# Patient Record
Sex: FEMALE | Race: WHITE | NOT HISPANIC OR LATINO | Employment: FULL TIME | URBAN - METROPOLITAN AREA
[De-identification: names, ages, dates, MRNs, and addresses within clinical notes are randomized per-mention and may not be internally consistent; named-entity substitution may affect disease eponyms.]

---

## 2024-02-28 ENCOUNTER — TELEPHONE (OUTPATIENT)
Dept: NEUROLOGY | Facility: CLINIC | Age: 44
End: 2024-02-28

## 2024-03-12 ENCOUNTER — OFFICE VISIT (OUTPATIENT)
Dept: ENDOCRINOLOGY | Facility: CLINIC | Age: 44
End: 2024-03-12
Payer: COMMERCIAL

## 2024-03-12 VITALS
SYSTOLIC BLOOD PRESSURE: 130 MMHG | HEIGHT: 63 IN | HEART RATE: 96 BPM | BODY MASS INDEX: 31.64 KG/M2 | DIASTOLIC BLOOD PRESSURE: 76 MMHG | WEIGHT: 178.6 LBS

## 2024-03-12 DIAGNOSIS — E04.1 THYROID NODULE: ICD-10-CM

## 2024-03-12 DIAGNOSIS — R79.89 ABNORMAL THYROID BLOOD TEST: Primary | ICD-10-CM

## 2024-03-12 PROCEDURE — 99244 OFF/OP CNSLTJ NEW/EST MOD 40: CPT | Performed by: STUDENT IN AN ORGANIZED HEALTH CARE EDUCATION/TRAINING PROGRAM

## 2024-03-12 RX ORDER — ONDANSETRON 4 MG/1
4 TABLET, FILM COATED ORAL EVERY 8 HOURS PRN
COMMUNITY

## 2024-03-12 RX ORDER — UBIDECARENONE 50 MG
50 CAPSULE ORAL DAILY
COMMUNITY

## 2024-03-12 NOTE — PROGRESS NOTES
Eve Crowley 43 y.o. female MRN: [id number]    Encounter: [id number]      Assessment/Plan     Assessment:  This is a 43 y.o.-year-old female who is seen in consultation for abnormal thyroid function studies    Plan:  Abnormal thyroid function test  TSH 0.41 (low), free t4 1.14 (high), thyroglobulin Ab 7 (high) on 3/4/2024. Prior to this her TSH was normal in Dec 2023 and sept 2022  - We suspect her abnormal thyroid function test is likely due to subacute thyroiditis related to her preceding viral illness and will obtain TSH, free t4, T3, TSI in about 4 weeks to evaluate for it.    Right thyroid nodule  - She is incidentally noted to have sub centimeter right thyroid nodule on MRI neck, we will obtain US thyroid to further characterize this.    CC:   Abnormal thyroid function test    History of Present Illness     HPI:  This is a 43 y.o.-year-old female who is seen in consultation for abnormal thyroid function studies    She went to the ED with face tingling and was found to have tachycardia in 150s, was suspected to have infection, was given antibiotics. She reports to have intermittent palpitations resting as well as on exertion. She has had tremors and anxiety since last 2 weeks. She is not on any OTC or herbal supplementations currently. Previously she was on multivitamin prior to recent ED visit. Denies of any amiodarone, lithium, iodinated contrast. Denies of any compressive sx. She had recent viral illnesses in mid Feb. She reports that she had rats in her house recently. She does not desire pregnancy. Denies of any skin, hair, nail changes. She reports to have cold/heat intolerance, lower energy, sleep disturbances in last 2 weeks. She had some diarrhea but attributes it to doxycycline use. She has lost 10 lbs in last 2 weeks. Denies of tobacco use and pain on eye movement or dry eyes.She has some blurry vision    Denies of any radiation to head, neck, chest area, neck surgeries. Denies of any concussions  in the past. Denies of any thyroid cancers. Mother and sister have hashimoto's thyroiditis. She has maternal aunt who has hashimoto's thyroiditis.    Review of Systems   Constitutional:  Positive for appetite change, fatigue and unexpected weight change. Negative for activity change.   HENT:  Negative for congestion, sore throat, trouble swallowing and voice change.    Eyes:  Negative for redness and visual disturbance.   Respiratory:  Negative for cough and shortness of breath.    Cardiovascular:  Positive for palpitations. Negative for leg swelling.   Gastrointestinal:  Positive for nausea. Negative for abdominal pain, constipation, diarrhea and vomiting.   Endocrine: Positive for cold intolerance and heat intolerance. Negative for polydipsia, polyphagia and polyuria.   Genitourinary:  Negative for difficulty urinating and dysuria.   Musculoskeletal:  Positive for neck pain. Negative for gait problem.   Skin:  Negative for color change.   Neurological:  Positive for dizziness. Negative for syncope.   Psychiatric/Behavioral:  Negative for agitation and behavioral problems. The patient is nervous/anxious.    All other systems reviewed and are negative.      Historical Information   No past medical history on file.  No past surgical history on file.  Social History   Social History     Substance and Sexual Activity   Alcohol Use Yes    Comment: not in a while     Social History     Substance and Sexual Activity   Drug Use Yes    Types: Marijuana    Comment: CBD gummies for anxity but not often     Social History     Tobacco Use   Smoking Status Never   Smokeless Tobacco Never     Family History: No family history on file.    Meds/Allergies   Current Outpatient Medications   Medication Sig Dispense Refill    ondansetron (Zofran) 4 mg tablet Take 4 mg by mouth every 8 (eight) hours as needed for nausea or vomiting      Cholecalciferol 50 MCG (2000 UT) CAPS Take 1 capsule by mouth      Coenzyme Q10 50 MG CAPS Take 50 mg  "by mouth daily      MAGNESIUM PO Take by mouth       No current facility-administered medications for this visit.     No Known Allergies    Objective   Vitals: Blood pressure 130/76, pulse 96, height 5' 3\" (1.6 m), weight 81 kg (178 lb 9.6 oz).    Physical Exam  Constitutional:       Appearance: Normal appearance.   HENT:      Head: Normocephalic and atraumatic.      Comments: No lid lag bilaterally  Cardiovascular:      Rate and Rhythm: Normal rate and regular rhythm.      Pulses: Normal pulses.      Heart sounds: Normal heart sounds. No murmur heard.     No gallop.   Pulmonary:      Effort: Pulmonary effort is normal.      Breath sounds: Normal breath sounds. No wheezing or rales.   Abdominal:      General: Bowel sounds are normal.      Palpations: Abdomen is soft.      Tenderness: There is no abdominal tenderness.   Musculoskeletal:         General: No swelling.      Cervical back: Normal range of motion and neck supple. No tenderness.      Right lower leg: No edema.      Left lower leg: No edema.      Comments: No tremors on outstretched hands   Lymphadenopathy:      Cervical: No cervical adenopathy.   Skin:     General: Skin is warm.   Neurological:      Mental Status: She is alert and oriented to person, place, and time. Mental status is at baseline.      Gait: Gait normal.   Psychiatric:         Mood and Affect: Mood normal.         Behavior: Behavior normal.         The history was obtained from the review of the chart, patient.    Lab Results:        Imaging Studies:       I have personally reviewed pertinent reports.      Portions of the record may have been created with voice recognition software. Occasional wrong word or \"sound a like\" substitutions may have occurred due to the inherent limitations of voice recognition software. Read the chart carefully and recognize, using context, where substitutions have occurred.    "

## 2024-03-22 ENCOUNTER — TELEPHONE (OUTPATIENT)
Dept: NEUROLOGY | Facility: CLINIC | Age: 44
End: 2024-03-22

## 2024-03-22 NOTE — TELEPHONE ENCOUNTER
Left voicemail for patient to call back and confirm upcoming appointment with Dr. Wolfe 3/27 at 8 am .

## 2024-03-27 ENCOUNTER — OFFICE VISIT (OUTPATIENT)
Dept: NEUROLOGY | Facility: CLINIC | Age: 44
End: 2024-03-27
Payer: COMMERCIAL

## 2024-03-27 VITALS
HEART RATE: 110 BPM | BODY MASS INDEX: 30.83 KG/M2 | TEMPERATURE: 97.8 F | DIASTOLIC BLOOD PRESSURE: 80 MMHG | HEIGHT: 63 IN | SYSTOLIC BLOOD PRESSURE: 128 MMHG | WEIGHT: 174 LBS

## 2024-03-27 DIAGNOSIS — R20.2 PARESTHESIAS: Primary | ICD-10-CM

## 2024-03-27 DIAGNOSIS — M54.2 NECK DISCOMFORT: ICD-10-CM

## 2024-03-27 DIAGNOSIS — M62.838 MUSCLE SPASMS OF NECK: ICD-10-CM

## 2024-03-27 DIAGNOSIS — R00.0 TACHYCARDIA: ICD-10-CM

## 2024-03-27 PROCEDURE — 99205 OFFICE O/P NEW HI 60 MIN: CPT | Performed by: PSYCHIATRY & NEUROLOGY

## 2024-03-27 RX ORDER — CHLORAL HYDRATE 500 MG
CAPSULE ORAL
COMMUNITY

## 2024-03-27 RX ORDER — TIZANIDINE HYDROCHLORIDE 2 MG/1
2 CAPSULE, GELATIN COATED ORAL
Qty: 30 CAPSULE | Refills: 1 | Status: SHIPPED | OUTPATIENT
Start: 2024-03-27

## 2024-03-27 NOTE — PROGRESS NOTES
NEUROLOGY [facility]ENC[facility] CLINIC     CONSULT NOTE         Patient ID: Eve Crowley is a 43 y.o. female.    Assessment/Plan:    Paresthesias  Patient with acute onset of facial numbness in V2 distribution and right index finger numbness in February 2024. Occurred after visiting a friend and sleeping on a different mattress/pillow. Evaluated in the ED and admitted for further workup. At the time was noted to have leukocytosis of 19, abnormal thyroid studies, initially equivocal Lyme testing but negative after reflex testing. This was confirmed by ID at follow up appointment. She also had MRI brain w/wo, MRA head and MRA neck that were all unrevealing. Additionally reports intermittent tachycardia since the onset of her numbness. On exam she does have decreased sensation to pinprick  Overall the etiology of her symptoms are unclear. Suspect that she likely had a viral illness at the time given the leukocytosis. Did not have an LP at the time but may have been a low-grade viral meningitis. Also on the differential includes atypical trigeminal neuralgia, MRI-negative stroke, vs complex headache. Recommend additional testing including MRI of the cervical spine without contrast (to note, has significant bilateral muscle spasm and reduced ROM in the cervical spine, reports straightening of cervical lordosis on XR done at chiropractor office) as well as check for Sjogren's antibodies as it can cause abnormal facial sensations. Will plan on following up with patient after testing completed. Advised to follow up with her infectious disease doctor and PCP regarding her symptoms. Follow up in our office in approximately 3 months. Advised to call with questions or concerns in the interim    Muscle spasms of neck  Neck discomfort along with reduced ROM and muscle spasms on exam. Additionally noted to have trigger points along the trapezius muscles. No tenderness over greater or lesser occipital nerves. Suspect that her symptoms may  be stemming from her neck and thus recommend additional imaging. She is already doing stretches/yoga for her neck and is seeing chiropractor. Also recommended using heat. Can also try PRN tizanidine at bedtime to help with the neck discomfort. Avoid chiropractic manipulation of the neck until imaging is completed     Tachycardia  On orthostatic vitals, noted to have an increase in HR without a change in BP. While this increase does not meet the threshold for POTS, concern that there may be an underlying dysautonomia given her intermittent tachycardia as well as GI issues. Will keep on the differential and consider further workup once initial workup is completed.        Diagnoses and all orders for this visit:    Paresthesias  -     MRI cervical spine wo contrast; Future  -     Sjogren's Antibodies; Future  -     TiZANidine (ZANAFLEX) 2 MG capsule; Take 1 capsule (2 mg total) by mouth daily at bedtime as needed for muscle spasms    Tachycardia    Neck discomfort  -     MRI cervical spine wo contrast; Future  -     TiZANidine (ZANAFLEX) 2 MG capsule; Take 1 capsule (2 mg total) by mouth daily at bedtime as needed for muscle spasms    Muscle spasms of neck  -     MRI cervical spine wo contrast; Future  -     TiZANidine (ZANAFLEX) 2 MG capsule; Take 1 capsule (2 mg total) by mouth daily at bedtime as needed for muscle spasms    Other orders  -     Omega-3 Fatty Acids (Omega-3 Fish Oil) 1000 MG CAPS; Take by mouth           Subjective:    HPI    I had the pleasure of seeing your patient, Eve Crowley, today in the Neurology clinic for initial consultation.    Eve is a 43 y.o. female with history of possible subacute thyroiditis who presents today for evaluation of facial numbness. Patient reports that symptoms started on 2/22/24. The few days prior had been visiting a friend and sleeping on a different mattress and pillow. She started to have neck pain the day prior to the onset of facial numbness. Described her neck  "pain as tightness. Her friend gave her a device to rub on her neck which felt good, however, when she used it along the spinous processes, she had sudden onset of dizziness (lightheadedness) and tachycardia. This lasted a few minutes and resolved. She sat down for a while and ate/drank something and felt better. Ended up driving home that night.    The next day, patient was at home and was making lunch when all of a sudden she started to have bilateral facial numbness. She sat down for a minute and symptoms persisted so her  took her to the hospital. While driving to the hospital the tingling spread throughout her entire body. She also felt weak and like she was going to pass out. When she arrived, a full workup was initiated. She was found to have leukocytosis of 19 however no source of infection was identified. She also had tachycardia for unclear reasons. Testing for Lyme was initially equivocal so she was treated for it with Doxycycline. Follow up reflex testing was negative but she decided to complete the course of doxycycline anyways. Has since followed up with ID and was told Lyme was negative. She did feel better with the antibiotics. ID physician also concerned that she may have had a viral illness at the time of her symptom onset and would have gotten an LP if seen while inpatient.     Since being discharged, she has continued to have tingling in her face but it is improving. It is now intermittent rather that constant. She continues to also have tingling in the right index finger. Intermittently feels lightheaded and has intermittent tachycardia. Has seen endocrinology who was concerned that she may have also had a virus and subacute thyroiditis. She has also lost 10 lbs unintentionally.     Went to a chiropractor yesterday and was told that her cervical spine alignment was off and that \"her head had an additional 10-15 lbs of weight on it due to the alignment\" on XR. Did adjustments including " cranial but denies having cracking of the neck.       Evaluated at [facility], Dr. Reese, on 3/21/24:  - unremarkable imaging, strength intact, sensation intact to light touch, unlikely to be related to spine. Recommend evaluation with neurology. Started on gabapentin -- 1 tablet QHS for days 1-3, 1 tablet BID for days 4-6, 1 tablet TID for days 7 and onward. -- Patient did not start gabapentin as she did not feel it was necessary      Prior Labs ([facility]):  - 3/4/24: TSH 0.41, Free T4 1.14, TPO autoantibodies 1, thyroglobulin autoantibody 7, CBC WNL  - 2/22/24: Lyme negative after reflex testing  - 12/1/23: total cholesterol 206, Tg 165, HDL 43, , vitamin D 29, vitamin B12 308        Prior Imaging from 2/22/24 ([facility]):  - MRI brain w/wo contrast: No acute infarction. No acute intracranial hemorrhage. No pathologic foci of   susceptibility. No pathologic intracranial enhancement. Ventricles are normal in size, shape, and position. No extra-axial collection. Major skull base flow voids are preserved. Moderate mucosal thickening in a few right anterior ethmoid air cells. The remainder of the paranasal sinuses and mastoid air cells are well aerated. No gross orbital mass. Sella is not expanded. Cerebellar tonsils are in normal position.   - MRA head: There is flow-related enhancement of the internal carotid arteries, basilar artery, vertebral  arteries and their branches. There is no hemodynamically significant stenosis. There is no saccular aneurysm. MR angiogram is less sensitive in detection of saccular aneurysms 3 mm or less in size.   - MRA Neck: Normal three-vessel arch. Vessel origins are patent. There is antegrade flow related enhancement of the carotid and vertebral arteries in the neck. There is no hemodynamically significant stenosis. There is no suspicious T1 hyperintensity on axial T1 fat-saturated images and no significant luminal caliber change to suggest dissection. No significant  "stenosis involving the proximal cervical internal carotid arteries by NASCET criteria.       The following portions of the patient's history were reviewed and updated as appropriate: allergies, current medications, past family history, past medical history, past social history, past surgical history, and problem list.         Objective:    Blood pressure 128/80, pulse (!) 110, temperature 97.8 °F (36.6 °C), height 5' 3\" (1.6 m), weight 78.9 kg (174 lb).    Vitals:    03/27/24 0802 03/27/24 0904 03/27/24 0905 03/27/24 0907   BP: 128/70 124/74 126/80 128/80   Pulse: 105 94 (!) 107 (!) 110         Physical Exam  Vitals and nursing note reviewed.   Constitutional:       General: She is not in acute distress.     Appearance: Normal appearance. She is not ill-appearing.   HENT:      Head: Normocephalic and atraumatic.      Right Ear: External ear normal.      Left Ear: External ear normal.      Nose: Nose normal.      Mouth/Throat:      Mouth: Mucous membranes are moist.      Pharynx: Oropharynx is clear.   Eyes:      General: Lids are normal. No scleral icterus.     Extraocular Movements: Extraocular movements intact.      Conjunctiva/sclera: Conjunctivae normal.      Pupils: Pupils are equal, round, and reactive to light.   Cardiovascular:      Rate and Rhythm: Tachycardia present.      Pulses: Normal pulses.   Pulmonary:      Effort: Pulmonary effort is normal. No respiratory distress.   Abdominal:      General: Abdomen is flat. There is no distension.   Musculoskeletal:      Cervical back: Normal range of motion and neck supple.      Right lower leg: No edema.      Left lower leg: No edema.   Skin:     General: Skin is warm and dry.      Capillary Refill: Capillary refill takes less than 2 seconds.   Neurological:      Motor: Motor strength is normal.     Deep Tendon Reflexes:      Reflex Scores:       Tricep reflexes are 2+ on the right side and 2+ on the left side.       Bicep reflexes are 2+ on the right side and " 2+ on the left side.       Brachioradialis reflexes are 2+ on the right side and 2+ on the left side.       Patellar reflexes are 2+ on the right side and 2+ on the left side.       Achilles reflexes are 2+ on the right side and 2+ on the left side.  Psychiatric:         Mood and Affect: Mood normal.         Speech: Speech normal.         Behavior: Behavior normal.         Neurological Exam  Mental Status  Awake, alert and oriented to person, place and time. Speech is normal. Language is fluent with no aphasia.    Cranial Nerves  CN II: Visual fields full to confrontation.  CN III, IV, VI: Extraocular movements intact bilaterally. Normal lids and orbits bilaterally. Pupils equal round and reactive to light bilaterally.  CN V:  Right: Diminished sensation of the entire right side of the face. Diminished to pinprick only.  Left: Diminished sensation of the entire left side of the face. Diminished to pinprick only.  CN VII: Full and symmetric facial movement.  CN VIII: Hearing is normal.  CN IX, X: Palate elevates symmetrically  CN XI: Shoulder shrug strength is normal.  CN XII: Tongue midline without atrophy or fasciculations.    Motor  Normal muscle bulk throughout. No fasciculations present. Normal muscle tone. No abnormal involuntary movements. Strength is 5/5 throughout all four extremities.    Sensory  Light touch is normal in upper and lower extremities. Pinprick is normal in upper and lower extremities. Temperature is normal in upper and lower extremities. Vibration is normal in upper and lower extremities. Proprioception is normal in upper and lower extremities.     Reflexes                                            Right                      Left  Brachioradialis                    2+                         2+  Biceps                                 2+                         2+  Triceps                                2+                         2+  Patellar                                2+                          2+  Achilles                                2+                         2+    Right pathological reflexes: Crossed adductor absent. Ankle clonus absent.  Left pathological reflexes: Crossed adductor absent. Ankle clonus absent.    Coordination  Right: Finger-to-nose normal. Rapid alternating movement normal.Left: Finger-to-nose normal. Rapid alternating movement normal.    Gait  Casual gait is normal including stance, stride, and arm swing. Able to rise from chair without using arms.        ROS:    Review of Systems   Constitutional:  Negative for activity change, appetite change, chills, fatigue and fever.   HENT:  Negative for congestion, hearing loss, rhinorrhea, tinnitus and trouble swallowing.    Eyes:  Negative for photophobia and visual disturbance.   Respiratory:  Negative for cough and shortness of breath.    Cardiovascular:  Positive for palpitations. Negative for chest pain and leg swelling.        + tachycardia   Gastrointestinal:  Negative for abdominal pain, diarrhea, nausea and vomiting.   Genitourinary:  Negative for difficulty urinating, dysuria and hematuria.        (-) urinary incontinence   Musculoskeletal:  Positive for neck pain. Negative for back pain and neck stiffness.   Skin:  Negative for rash.   Neurological:  Positive for numbness (tingling). Negative for dizziness, tremors, seizures, syncope, facial asymmetry, speech difficulty, weakness, light-headedness and headaches.   Psychiatric/Behavioral:  Negative for confusion, dysphoric mood and sleep disturbance. The patient is not nervous/anxious.    All other systems reviewed and are negative.    ======    I have discussed the patient's history, physical exam findings, assessment, and plan in detail with attending, Dr. Gomez    Thank you for allowing me to participate in the care of your patient, Eveomari Crowley.    Ciara Wolfe, DO  [facility], PGY-4

## 2024-03-27 NOTE — PATIENT INSTRUCTIONS
We think that you most likely had some viral illness at the times that your symptoms started. May have been viral meningitis  Also your neck muscles are very tight and in spasm - recommend MRI of the neck to look at the bones and spinal cord. Will also check for sjogrens as that can also affect the face.  Keep your follow up with infectious disease  Start Tizanidine 2 mg as needed at bedtime for muscle spasm in the neck

## 2024-03-28 PROBLEM — M62.838 MUSCLE SPASMS OF NECK: Status: ACTIVE | Noted: 2024-03-28

## 2024-03-28 PROBLEM — R00.0 TACHYCARDIA: Status: ACTIVE | Noted: 2024-03-28

## 2024-03-28 PROBLEM — M54.2 NECK DISCOMFORT: Status: ACTIVE | Noted: 2024-03-28

## 2024-03-28 PROBLEM — R20.2 PARESTHESIAS: Status: ACTIVE | Noted: 2024-03-28

## 2024-03-28 NOTE — ASSESSMENT & PLAN NOTE
On orthostatic vitals, noted to have an increase in HR without a change in BP. While this increase does not meet the threshold for POTS, concern that there may be an underlying dysautonomia given her intermittent tachycardia as well as GI issues. Will keep on the differential and consider further workup once initial workup is completed.

## 2024-03-28 NOTE — ASSESSMENT & PLAN NOTE
Neck discomfort along with reduced ROM and muscle spasms on exam. Additionally noted to have trigger points along the trapezius muscles. No tenderness over greater or lesser occipital nerves. Suspect that her symptoms may be stemming from her neck and thus recommend additional imaging. She is already doing stretches/yoga for her neck and is seeing chiropractor. Also recommended using heat. Can also try PRN tizanidine at bedtime to help with the neck discomfort. Avoid chiropractic manipulation of the neck until imaging is completed

## 2024-03-28 NOTE — ASSESSMENT & PLAN NOTE
Patient with acute onset of facial numbness in V2 distribution and right index finger numbness in February 2024. Occurred after visiting a friend and sleeping on a different mattress/pillow. Evaluated in the ED and admitted for further workup. At the time was noted to have leukocytosis of 19, abnormal thyroid studies, initially equivocal Lyme testing but negative after reflex testing. This was confirmed by ID at follow up appointment. She also had MRI brain w/wo, MRA head and MRA neck that were all unrevealing. Additionally reports intermittent tachycardia since the onset of her numbness. On exam she does have decreased sensation to pinprick  Overall the etiology of her symptoms are unclear. Suspect that she likely had a viral illness at the time given the leukocytosis. Did not have an LP at the time but may have been a low-grade viral meningitis. Also on the differential includes atypical trigeminal neuralgia, MRI-negative stroke, vs complex headache. Recommend additional testing including MRI of the cervical spine without contrast (to note, has significant bilateral muscle spasm and reduced ROM in the cervical spine, reports straightening of cervical lordosis on XR done at chiropractor office) as well as check for Sjogren's antibodies as it can cause abnormal facial sensations. Will plan on following up with patient after testing completed. Advised to follow up with her infectious disease doctor and PCP regarding her symptoms. Follow up in our office in approximately 3 months. Advised to call with questions or concerns in the interim

## 2024-04-01 ENCOUNTER — TELEPHONE (OUTPATIENT)
Dept: NEUROLOGY | Facility: CLINIC | Age: 44
End: 2024-04-01

## 2024-04-01 NOTE — TELEPHONE ENCOUNTER
Recd vm 3/27/24 3:58pm  I my name is Eve summers. I was in the office today for a visit and I had some follow up questions. For some reason, I'm not able to connect to you guys on my carlo to ask the questions. If you could please give me a call back [phone]. Thank you. Sonia.    ___________________________________________    Left detailed msg (per consent in chart) requesting return call or MyChart msg w/pt's questions.

## 2024-04-02 ENCOUNTER — APPOINTMENT (OUTPATIENT)
Age: 44
End: 2024-04-02
Payer: COMMERCIAL

## 2024-04-02 DIAGNOSIS — R79.89 ABNORMAL THYROID BLOOD TEST: ICD-10-CM

## 2024-04-02 DIAGNOSIS — R20.2 PARESTHESIAS: ICD-10-CM

## 2024-04-02 LAB — T3 SERPL-MCNC: 1.1 NG/ML

## 2024-04-02 PROCEDURE — 86235 NUCLEAR ANTIGEN ANTIBODY: CPT

## 2024-04-02 PROCEDURE — 36415 COLL VENOUS BLD VENIPUNCTURE: CPT

## 2024-04-02 PROCEDURE — 84480 ASSAY TRIIODOTHYRONINE (T3): CPT

## 2024-04-02 NOTE — TELEPHONE ENCOUNTER
ABBY 3/28 at 1:40 pm:    Hi my name is Eve Crowley. Birth date [birth year]. I'm calling because I was just in yesterday and I am really not feeling well today or yesterday. I'm having a lot of nausea and some head pressure and dizziness. Dizziness gets worse if I try to read or if like, the kids turn on TV or if they jostle me at all. So, just looking for some advice, if you could give me a call back [phone]. Thank you.  ______________________________________________________________________________    Please see previous message. A message was left yesterday for pt to call the office back.

## 2024-04-03 LAB
ENA SS-A AB SER-ACNC: <0.2 AI (ref 0–0.9)
ENA SS-B AB SER-ACNC: <0.2 AI (ref 0–0.9)

## 2024-04-09 ENCOUNTER — APPOINTMENT (OUTPATIENT)
Dept: LAB | Facility: MEDICAL CENTER | Age: 44
End: 2024-04-09
Payer: COMMERCIAL

## 2024-04-09 ENCOUNTER — HOSPITAL ENCOUNTER (OUTPATIENT)
Dept: ULTRASOUND IMAGING | Facility: MEDICAL CENTER | Age: 44
Discharge: HOME | End: 2024-04-09
Payer: COMMERCIAL

## 2024-04-09 DIAGNOSIS — E04.1 THYROID NODULE: ICD-10-CM

## 2024-04-09 DIAGNOSIS — R79.89 ABNORMAL THYROID BLOOD TEST: ICD-10-CM

## 2024-04-09 LAB
T4 FREE SERPL-MCNC: 0.82 NG/DL (ref 0.61–1.12)
TSH SERPL DL<=0.05 MIU/L-ACNC: 0.67 UIU/ML (ref 0.45–4.5)

## 2024-04-09 PROCEDURE — 84443 ASSAY THYROID STIM HORMONE: CPT

## 2024-04-09 PROCEDURE — 84439 ASSAY OF FREE THYROXINE: CPT

## 2024-04-09 PROCEDURE — 36415 COLL VENOUS BLD VENIPUNCTURE: CPT

## 2024-04-09 PROCEDURE — 76536 US EXAM OF HEAD AND NECK: CPT

## 2024-04-09 PROCEDURE — 84445 ASSAY OF TSI GLOBULIN: CPT

## 2024-04-10 ENCOUNTER — HOSPITAL ENCOUNTER (OUTPATIENT)
Facility: MEDICAL CENTER | Age: 44
Discharge: HOME | End: 2024-04-10
Payer: COMMERCIAL

## 2024-04-10 DIAGNOSIS — M62.838 MUSCLE SPASMS OF NECK: ICD-10-CM

## 2024-04-10 DIAGNOSIS — M54.2 NECK DISCOMFORT: ICD-10-CM

## 2024-04-10 DIAGNOSIS — R20.2 PARESTHESIAS: ICD-10-CM

## 2024-04-10 PROCEDURE — 72141 MRI NECK SPINE W/O DYE: CPT

## 2024-04-11 LAB — TSI SER-ACNC: <0.1 IU/L (ref 0–0.55)

## 2024-04-12 NOTE — TELEPHONE ENCOUNTER
24, 4:58    Hi. My name is louisa Velez 82. My phone # is [phone] . I was calling with some questions we had a bit of a phone tag. So the questions that I had were, I wanted to clarify with the doctor whether the other symptoms that I am experiencing are coming from the tightness of my neck like the lightheadedness, accelerated heart rate at times, the nausea. I was feeling really nauseous right after my appt, like  with a really bad nausea though I've been better mostly. Still having some nausea off and on and that has been hard to deal with. The other thing I wanted to clarify was, they had mentioned neck manipulation with the chiropractor and that not really been recommended but I just wanted to double check with her that does not recommended at all or whether you had said that when it's gentle that's ok. And also, the muscle relaxer that was prescribed for some reason it's not going through with [facility]. And I have been trying to connect with you guys on the [facility] for some reason you are not in my caregiver list. And I did what they told me to do to try to get you added to that and so far I have not been successful. I don't know if you guys can message me to maybe help fix that or what we can do but I left a couple messages to try to get you added but if you can give me a call back, I'll appreciate it. Also my MRI is already scheduled for tmrw but I was wondering if it could be all beneficial to do more than just the cervical region. So if you can give me a call back. Thanks, bye    No transcription. It took 21:40 to transcribe this message

## 2024-04-12 NOTE — TELEPHONE ENCOUNTER
Called pt to let her know that I received her message and awaiting response from Dr Wolfe. Pt verbalized understanding. Pt is also asking if Dr Wolfe can send her a message via [employer].    Pls see below

## 2024-04-15 ENCOUNTER — TELEPHONE (OUTPATIENT)
Dept: NEUROLOGY | Facility: CLINIC | Age: 44
End: 2024-04-15

## 2024-04-23 ENCOUNTER — OFFICE VISIT (OUTPATIENT)
Dept: NEUROLOGY | Facility: CLINIC | Age: 44
End: 2024-04-23
Payer: COMMERCIAL

## 2024-04-23 VITALS
BODY MASS INDEX: 30.44 KG/M2 | OXYGEN SATURATION: 96 % | HEIGHT: 63 IN | HEART RATE: 91 BPM | SYSTOLIC BLOOD PRESSURE: 122 MMHG | WEIGHT: 171.8 LBS | RESPIRATION RATE: 18 BRPM | DIASTOLIC BLOOD PRESSURE: 74 MMHG

## 2024-04-23 DIAGNOSIS — R20.2 PARESTHESIAS: Primary | ICD-10-CM

## 2024-04-23 DIAGNOSIS — M79.18 MYOFASCIAL PAIN SYNDROME: ICD-10-CM

## 2024-04-23 PROCEDURE — 99214 OFFICE O/P EST MOD 30 MIN: CPT | Performed by: PSYCHIATRY & NEUROLOGY

## 2024-04-23 NOTE — PROGRESS NOTES
NEUROLOGY RESIDENCY CLINIC     PROGRESS NOTE         Patient ID: Eve Crowley is a 43 y.o. female.    Assessment/Plan:    Paresthesias  Patient continues to have bilateral facial tingling in the V2 and V3 distribution, sparing the V1 areas. Previously also had symptoms in right index finger which have since resolved. Notes new tingling in upper and middle back. At last visit we ordered an MRI of the cervical spine which was relatively normal. There was some straightening of the cervical spine but otherwise the cord and nerve roots were all normal. Interestingly, at the same time her paresthesias started, she also was diagnosed with subacute thyroiditis which was suspected to be due to viral illness and may have also had a mild viral meningitis at the same time though LP was never performed. Overall she has improved from the endocrine standpoint. Still having intermittent fast palpitations and have asked for her to follow up with her cardiologist. She had a Holter monitor which was normal, Zio patch was denied. Today, on her exam there is marked muscle spasm in the bilateral cervical paraspinal muscles, trapezius muscles, scalenes, rhomboids, and latissimus dorsi. Palpation of the paraspinal muscles reproduces some of her paresthesias. With normal MRI and the exam findings noted, have strong suspicion that her symptoms are due to myofascial pain syndrome, and as such, will refer pt to PT. Have also encouraged her to use heat, stretching at home, and use Tizanidine as needed. However, with her ongoing symptoms, will also add on additional testing for a few other autoimmune conditions including KM, RF, and anti-scl antibodies. We will plan on following up with her in approximately 3-4 months or sooner if needed. Advised to call with questions or concerns.    Myofascial pain syndrome  Significant bilateral muscle spasms and tenderness along the bilateral cervical paraspinal, scalenes, trapezius, rhomboids, and  latissimus dorsi with associated tender points and trigger points. Palpation over the cervical region and posterior scalp along the inferior nuchal ridge reproduce her facial tingling symptoms which supports myofascial etiology of symptoms. She has started Tizanidine with some response (noted improvement in nausea - this is likely due to myofascial involvement of the vagus nerve). Will place orders for PT. Recommended continued use of muscle relaxers in combination with PT for added benefit. Can also use heat to help with muscle relaxation. Continue PO magnesium. Can consider percussion massage device at home vs massage therapy as well.        Diagnoses and all orders for this visit:    Paresthesias  -     Ambulatory Referral to Physical Therapy; Future  -     Anti-scleroderma antibody; Future  -     KM Screen w/ Reflex to Titer/Pattern; Future  -     RF Screen w/ Reflex to Titer; Future    Myofascial pain syndrome    Other orders  -     CARBONYL IRON PO; Take by mouth           Subjective:    HPI    I had the pleasure of seeing your patient, Eve Crowley, today in the Neurology clinic for follow up. As you know, she is a 43 y.o. female with history of possible subacute thyroiditis. She was last seen in the clinic on 3/27/24 for evaluation of facial paresthesias.     4/23/24 Interim History:  Since last visit, patient continues to have bilateral facial tingling in V2-V3 distribution. This is relatively unchanged. The tingling in the right index finger has subsided. Has noticed new tingling in the upper and mid back which is hard to distinguish from muscle spasm. Continues to have neck discomfort. Started Tizanidine as muscle relaxer. Has helped some. Does not notice a specific muscular improvement but her daily nausea has gotten better. With regards to nausea, having nausea throughout the day with decreased appetite. Denies vomiting. She is eating because she has to. Continues to have intermittent fast  palpitations. This is most noticeable when walking up the stairs. Has seen cardiology at [facility]. Had recent Holter monitor which was normal. Zio patch was ordered but was denied by her insurance. There is significant family history of cardiac electrophysiological conditions including WPW and SVT. She has lost another 4 lb. This happened over one-week time span. Has lost about 20 lb since the start of her illness. Has had repeat endocrine labs and thyroid has normalized.     Had one bad day a few weeks ago and almost went back to the hospital. She started off the day feeling very nauseous. She was at a PT appointment for her son when she started to have heart racing and chest tightness. The PT was concerned how she looked at the time. She ended up going home. Her mother was visiting and felt that her symptoms were more likely anxiety so she did not go to the hospital. Saw her PCP the next day and everything was fine.    She is taking CBD for anxiety.    Menses are starting to become somewhat irregular. Had two closer together then next was a few days late. Current menses started at day 22. One cycle was bright red the whole time.    - 4/10/24 MRI C-spine wo contrast: straightening of cervical lordosis, minimal spondylosis, no disk herniation, no central canal or neural foraminal stenosis, cord normal  - 4/9/24 labs: TSH 0.666, Free T4 0.82, TSI < 0.10  - 4/2/24 labs: CBC notable for hgb 14.8, hcg 44.0, wbc 10.7, rbc 4.84, platelets 354; T3 1.1, Sjogren's ab negative      3/27/24 Intake History:  Patient reports that symptoms started on 2/22/24. The few days prior had been visiting a friend and sleeping on a different mattress and pillow. She started to have neck pain the day prior to the onset of facial numbness. Described her neck pain as tightness. Her friend gave her a device to rub on her neck which felt good, however, when she used it along the spinous processes, she had sudden onset of dizziness (lightheadedness)  "and tachycardia. This lasted a few minutes and resolved. She sat down for a while and ate/drank something and felt better. Ended up driving home that night.     The next day, patient was at home and was making lunch when all of a sudden she started to have bilateral facial numbness. She sat down for a minute and symptoms persisted so her  took her to the hospital. While driving to the hospital the tingling spread throughout her entire body. She also felt weak and like she was going to pass out. When she arrived, a full workup was initiated. She was found to have leukocytosis of 19 however no source of infection was identified. She also had tachycardia for unclear reasons. Testing for Lyme was initially equivocal so she was treated for it with Doxycycline. Follow up reflex testing was negative but she decided to complete the course of doxycycline anyways. Has since followed up with ID and was told Lyme was negative. She did feel better with the antibiotics. ID physician also concerned that she may have had a viral illness at the time of her symptom onset and would have gotten an LP if seen while inpatient.      Since being discharged, she has continued to have tingling in her face but it is improving. It is now intermittent rather that constant. She continues to also have tingling in the right index finger. Intermittently feels lightheaded and has intermittent tachycardia. Has seen endocrinology who was concerned that she may have also had a virus and subacute thyroiditis. She has also lost 10 lbs unintentionally.      Went to a chiropractor yesterday and was told that her cervical spine alignment was off and that \"her head had an additional 10-15 lbs of weight on it due to the alignment\" on XR. Did adjustments including cranial but denies having cracking of the neck.      Evaluated at [facility], Dr. Reese, on 3/21/24:  - unremarkable imaging, strength intact, sensation intact to light " touch, unlikely to be related to spine. Recommend evaluation with neurology. Started on gabapentin -- 1 tablet QHS for days 1-3, 1 tablet BID for days 4-6, 1 tablet TID for days 7 and onward. -- Patient did not start gabapentin as she did not feel it was necessary     Prior Labs ([facility]):  - 3/4/24: TSH 0.41, Free T4 1.14, TPO autoantibodies 1, thyroglobulin autoantibody 7, CBC WNL  - 2/22/24: Lyme negative after reflex testing  - 12/1/23: total cholesterol 206, Tg 165, HDL 43, , vitamin D 29, vitamin B12 308     Prior Imaging from 2/22/24 ([facility]):  - MRI brain w/wo contrast: No acute infarction. No acute intracranial hemorrhage. No pathologic foci of   susceptibility. No pathologic intracranial enhancement. Ventricles are normal in size, shape, and position. No extra-axial collection. Major skull base flow voids are preserved. Moderate mucosal thickening in a few right anterior ethmoid air cells. The remainder of the paranasal sinuses and mastoid air cells are well aerated. No gross orbital mass. Sella is not expanded. Cerebellar tonsils are in normal position.   - MRA head: There is flow-related enhancement of the internal carotid arteries, basilar artery, vertebral  arteries and their branches. There is no hemodynamically significant stenosis. There is no saccular aneurysm. MR angiogram is less sensitive in detection of saccular aneurysms 3 mm or less in size.   - MRA Neck: Normal three-vessel arch. Vessel origins are patent. There is antegrade flow related enhancement of the carotid and vertebral arteries in the neck. There is no hemodynamically significant stenosis. There is no suspicious T1 hyperintensity on axial T1 fat-saturated images and no significant luminal caliber change to suggest dissection. No significant stenosis involving the proximal cervical internal carotid arteries by NASCET criteria.       The following portions of the patient's history were reviewed and updated as appropriate: allergies,  "current medications, past family history, past medical history, past social history, past surgical history, and problem list.         Objective:    Blood pressure 122/74, pulse 91, resp. rate 18, height 5' 3\" (1.6 m), weight 77.9 kg (171 lb 12.8 oz), SpO2 96%.    Physical Exam  Vitals and nursing note reviewed.   Constitutional:       General: She is not in acute distress.     Appearance: Normal appearance. She is not ill-appearing.   HENT:      Head: Normocephalic and atraumatic.      Nose: Nose normal.      Mouth/Throat:      Mouth: Mucous membranes are moist.      Pharynx: Oropharynx is clear.   Eyes:      General: Lids are normal.      Extraocular Movements: Extraocular movements intact.      Conjunctiva/sclera: Conjunctivae normal.      Pupils: Pupils are equal, round, and reactive to light.   Neck:      Thyroid: No thyroid mass.      Trachea: Trachea and phonation normal.   Cardiovascular:      Rate and Rhythm: Normal rate.      Pulses: Normal pulses.   Pulmonary:      Effort: Pulmonary effort is normal. No respiratory distress.   Abdominal:      General: Abdomen is flat. There is no distension.   Musculoskeletal:      Cervical back: Spasms and tenderness present. No bony tenderness. Pain with movement and muscular tenderness present. No spinous process tenderness. Decreased range of motion.      Thoracic back: Spasms and tenderness present. No bony tenderness.      Right lower leg: No edema.      Left lower leg: No edema.   Skin:     General: Skin is warm and dry.      Capillary Refill: Capillary refill takes less than 2 seconds.   Neurological:      Motor: Motor strength is normal.     Deep Tendon Reflexes:      Reflex Scores:       Tricep reflexes are 2+ on the right side and 2+ on the left side.       Bicep reflexes are 2+ on the right side and 2+ on the left side.       Brachioradialis reflexes are 2+ on the right side and 2+ on the left side.       Patellar reflexes are 2+ on the right side and 2+ on the " left side.       Achilles reflexes are 2+ on the right side and 2+ on the left side.  Psychiatric:         Mood and Affect: Mood normal.         Speech: Speech normal.         Behavior: Behavior normal.         Neurological Exam  Mental Status  Awake, alert and oriented to person, place and time. Speech is normal. Language is fluent with no aphasia.    Cranial Nerves  CN II: Visual fields full to confrontation.  CN III, IV, VI: Extraocular movements intact bilaterally. Normal lids and orbits bilaterally. Pupils equal round and reactive to light bilaterally.  CN V: Facial sensation is normal.  CN VII: Full and symmetric facial movement.  CN VIII: Hearing is normal.  CN IX, X: Palate elevates symmetrically  CN XI: Shoulder shrug strength is normal.  CN XII: Tongue midline without atrophy or fasciculations.  Subjective bilateral facial tingling in V2-V3 distributions without clear objective sensory findings on exam.    Motor  Normal muscle bulk throughout. No fasciculations present. Normal muscle tone. No abnormal involuntary movements. Strength is 5/5 throughout all four extremities.    Sensory  Light touch is normal in upper and lower extremities. Pinprick is normal in upper and lower extremities. Temperature is normal in upper and lower extremities.     Reflexes                                            Right                      Left  Brachioradialis                    2+                         2+  Biceps                                 2+                         2+  Triceps                                2+                         2+  Patellar                                2+                         2+  Achilles                                2+                         2+    Right pathological reflexes: Reza's absent. Crossed adductor absent. Ankle clonus absent.  Left pathological reflexes: Reza's absent. Crossed adductor absent. Ankle clonus absent.    Coordination  Right: Finger-to-nose normal. Rapid  alternating movement normal.Left: Finger-to-nose normal. Rapid alternating movement normal.    Gait  Casual gait is normal including stance, stride, and arm swing. Able to rise from chair without using arms.        ROS:    Review of Systems   Constitutional:  Positive for activity change, fatigue and unexpected weight change. Negative for appetite change, chills and fever.   HENT:  Negative for congestion, hearing loss, rhinorrhea, tinnitus and trouble swallowing.    Eyes:  Negative for photophobia and visual disturbance.   Respiratory:  Negative for cough and shortness of breath.    Cardiovascular:  Positive for palpitations. Negative for chest pain and leg swelling.   Gastrointestinal:  Positive for nausea. Negative for abdominal pain, diarrhea and vomiting.   Genitourinary:  Negative for difficulty urinating, dysuria and hematuria.        (-) urinary incontinence   Musculoskeletal:  Positive for back pain, myalgias, neck pain and neck stiffness.   Skin:  Negative for rash.   Neurological:  Positive for numbness. Negative for dizziness, tremors, seizures, syncope, facial asymmetry, speech difficulty, weakness, light-headedness and headaches.        + tingling   Psychiatric/Behavioral:  Positive for sleep disturbance. Negative for confusion and dysphoric mood. The patient is nervous/anxious.    All other systems reviewed and are negative.    ======    I have discussed the patient's history, physical exam findings, assessment, and plan in detail with attending, Dr. Russo    Thank you for allowing me to participate in the care of your patient, Eve RIGGS Keo.    Ciara Wolfe, DO  [facility] Neurology Res[facility], PGY-4

## 2024-04-23 NOTE — PATIENT INSTRUCTIONS
We will check a few additional labs for inflammatory conditions  Continue using heat at least twice a day  Continue Tizanidine  Will put in a referral to PT for myofascial pain  Follow up with cardiology to see if they can get the zio patch approved, particularly because you continue to have these episodes and the family history

## 2024-04-24 NOTE — ASSESSMENT & PLAN NOTE
Patient continues to have bilateral facial tingling in the V2 and V3 distribution, sparing the V1 areas. Previously also had symptoms in right index finger which have since resolved. Notes new tingling in upper and middle back. At last visit we ordered an MRI of the cervical spine which was relatively normal. There was some straightening of the cervical spine but otherwise the cord and nerve roots were all normal. Interestingly, at the same time her paresthesias started, she also was diagnosed with subacute thyroiditis which was suspected to be due to viral illness and may have also had a mild viral meningitis at the same time though LP was never performed. Overall she has improved from the endocrine standpoint. Still having intermittent fast palpitations and have asked for her to follow up with her cardiologist. She had a Holter monitor which was normal, Zio patch was denied. Today, on her exam there is marked muscle spasm in the bilateral cervical paraspinal muscles, trapezius muscles, scalenes, rhomboids, and latissimus dorsi. Palpation of the paraspinal muscles reproduces some of her paresthesias. With normal MRI and the exam findings noted, have strong suspicion that her symptoms are due to myofascial pain syndrome, and as such, will refer pt to PT. Have also encouraged her to use heat, stretching at home, and use Tizanidine as needed. However, with her ongoing symptoms, will also add on additional testing for a few other autoimmune conditions including KM, RF, and anti-scl antibodies. We will plan on following up with her in approximately 3-4 months or sooner if needed. Advised to call with questions or concerns.

## 2024-04-24 NOTE — ASSESSMENT & PLAN NOTE
Significant bilateral muscle spasms and tenderness along the bilateral cervical paraspinal, scalenes, trapezius, rhomboids, and latissimus dorsi with associated tender points and trigger points. Palpation over the cervical region and posterior scalp along the inferior nuchal ridge reproduce her facial tingling symptoms which supports myofascial etiology of symptoms. She has started Tizanidine with some response (noted improvement in nausea - this is likely due to myofascial involvement of the vagus nerve). Will place orders for PT. Recommended continued use of muscle relaxers in combination with PT for added benefit. Can also use heat to help with muscle relaxation. Continue PO magnesium. Can consider percussion massage device at home vs massage therapy as well.

## 2024-04-29 ENCOUNTER — EVALUATION (OUTPATIENT)
Dept: PHYSICAL THERAPY | Facility: CLINIC | Age: 44
End: 2024-04-29
Payer: COMMERCIAL

## 2024-04-29 DIAGNOSIS — R20.2 PARESTHESIAS: Primary | ICD-10-CM

## 2024-04-29 PROCEDURE — 97112 NEUROMUSCULAR REEDUCATION: CPT

## 2024-04-29 PROCEDURE — 97161 PT EVAL LOW COMPLEX 20 MIN: CPT

## 2024-04-29 NOTE — PROGRESS NOTES
PT Evaluation     Today's date: 2024  Patient name: Eve Crowley  : 1980  MRN: [id number]  Referring provider: Anna Lynn,*  Dx:   Encounter Diagnosis     ICD-10-CM    1. Paresthesias  R20.2 Ambulatory Referral to Physical Therapy                     Assessment  Assessment details: Pt is a 43 y.o. year old female that presents to outpatient physical therapy with paresthesias. Pt reported increase of numbness and tingling with ULTT to (B) upper extremities the median, ulnar and radial nerves. She also reported increase numbness to her face, ears, and mouth with nerve length testing. Noted hypersensitivity with light palpation the upper trapezius region (B) as well as radicular symptoms with thoracic joint mobility testing. Pt demonstrates increased tone of upper traps, levator scaps, and suboccipitals, with decreased strength of deep neck flexors and scapular retractors. Pt demonstrates increased pain, decreased ROM, decreased strength, decreased activity tolerance, and decreased functional activity due to pain. Pt appears motivated; HEP was reviewed and given to patient. Pt would benefit from skilled physical therapy to address noted impairments, meet patient's goals, and to return to [facility].   Thank you for this referral.    Impairments: abnormal muscle tone, abnormal or restricted ROM, activity intolerance, impaired physical strength, lacks appropriate home exercise program, pain with function, scapular dyskinesis, poor posture  and poor body mechanics    Symptom irritability: lowUnderstanding of Dx/Px/POC: good   Prognosis: fair    Goals  STGs:   1. Pt will be able to demonstrate an increase of strength by at least 1/2 grade within 4 weeks   2. Pt will be able to achieve increased ROM by at least 25% within 4 weeks.   3. Pt will be able to demonstrate improved deep neck flexor endurance by at least 10 seconds within 4 weeks   4. Pt will be able to report no radicular symptoms below  glenohumeral region within 4 weeks.  5. Pt will be able to demonstrate improve cervical and thoracic PA joint mobility to WNL without symptoms within 4 weeks.     LTGs:   1. Pt will be independent with all IADLs without pain or discomfort upon discharge.   2. Pt will be independent with HEP upon discharge   3. Pt will be able to report no pain or discomfort with all work duties and recreational activities for a full day upon discharge.  4. Pt will be able to report 'no difficulty' with heavy household activities such as cleaning or lifting at least 10 lbs to waist upon discharge         Plan  Patient would benefit from: PT eval and skilled physical therapy  Planned modality interventions: low level laser therapy, cryotherapy, electrical stimulation/Russian stimulation, iontophoresis, traction, ultrasound, unattended electrical stimulation, TENS and thermotherapy: hydrocollator packs  Planned therapy interventions: abdominal trunk stabilization, joint mobilization, manual therapy, massage, muscle pump exercises, neuromuscular re-education, patient education, postural training, strengthening, stretching, therapeutic activities, therapeutic exercise, therapeutic training, home exercise program, functional ROM exercises, flexibility, breathing training, body mechanics training, behavior modification, balance, activity modification, IADL retraining, nerve gliding and sensory integrative techniques  Frequency: 2x week  Duration in visits: 16  Duration in weeks: 8  Treatment plan discussed with: patient        Subjective Evaluation    History of Present Illness  Mechanism of injury: Pt states that she has been having neck pain/discomfort as well as face and UE numbness and tingling for the past few months. Patient reports that symptoms started on 2/22/24. The few days prior had been visiting a friend and sleeping on a different mattress and pillow. She started to have neck pain the day prior to the onset of facial  numbness. Her friend gave her a device to rub on her neck which felt good, however, when she used it along the spinous processes, she had sudden onset of dizziness (lightheadedness) and tachycardia. The next day, she started to have bilateral facial numbness and when to the hospital. Due to full body numbness/tingling.     Since being discharged, she has continued to have tingling in her face but it is improving slowly. It is now intermittent rather that constant. She continues to also have tingling in the right index finger. Intermittently feels lightheaded and has intermittent tachycardia.    Has been going to a chiropractor a few times.    Now primary complaints of suboccipital pressure, neck tightness and occasional numbness in her face, UE and back that seems to be random.     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    Shoulders  Elevated and rounded.    Scapulae  Left winging, right winging, left elevated and right elevated.    Thoracic Spine  Flattened thoracic spine.    Palpation   Left   Hypertonic in the cervical paraspinals and levator scapulae.   Tenderness of the cervical paraspinals, latissimus, levator scapulae, middle trapezius, scalenes and upper trapezius.     Right   Hypertonic in the cervical paraspinals, levator scapulae, suboccipitals and upper trapezius.   Tenderness of the cervical paraspinals, latissimus, levator scapulae, middle trapezius, scalenes, suboccipitals and upper trapezius.   Trigger point to  suboccipitals.     Neurological Testing     Sensation   Cervical/Thoracic   Left   Intact: light touch    Right   Intact: light touch    Comments   Right light touch: at C7 level.     Lumbar   Left   Intact: light touch    Right   Intact: light touch    Reflexes   Left   Biceps (C5/C6): normal (2+)  Brachioradialis (C6): normal (2+)  Triceps (C7): trace (1+)  Patellar (L4): brisk (3+)  Achilles (S1): normal (2+)  Wade's reflex: negative  Clonus sign: negative    Right   Biceps (C5/C6): normal (2+)  Brachioradialis (C6): normal (2+)  Triceps (C7): trace (1+)  Patellar (L4): brisk (3+)  Achilles (S1): normal (2+)  Wade's reflex: negative  Clonus sign: negative    Active Range of Motion   Cervical/Thoracic Spine       Cervical    Flexion:  Restriction level: minimal  Extension:  Restriction level: minimal  Left lateral flexion: 22 degrees     with pain  Right lateral flexion: 28 degrees     with pain  Left rotation:  Restriction level: minimal  Right rotation:  Restriction level: minimal    Strength/Myotome Testing     Left Shoulder     Planes of Motion   Flexion: 4+   Extension: 4+   Abduction: 4+   External rotation at 0°: 4+     Right Shoulder     Planes of Motion   Flexion: 4+   Extension: 4+   Abduction: 4+   External rotation at 0°: 4+     Left Elbow   Flexion: 4+  Extension: 4+    Right Elbow   Flexion: 4+  Extension: 4    Left Wrist/Hand   Thumb extension: 4+    Right Wrist/Hand   Thumb extension: 4+    Tests   Cervical   Positive neck flexor muscle endurance test.    Thoracic   Positive slump test.     Left Shoulder   Positive ULTT1, ULTT3 and ULTT4.     Right Shoulder   Positive ULTT1 and ULTT3.   Negative ULTT4.     Lumbar     Left   Positive slump test.     Right   Positive slump test.     Additional Tests Details  DNF endurance: 9 seconds with loss of form   Neuro Exam:     Oculomotor exam   Oculomotor ROM: WNL  Resting nystagmus: not present   Gaze holding nystagmus: not present left  and not  present right  Smooth pursuits: within normal limits             Precautions: Tachycardia     Daily Treatment Diary    Date 4/29            FOTO IE -             Re-Eval IE               Manuals    UT, LS, SOR STM                                                     Neuro Re-Ed     Median nerve glide             Median nerve floss - prayer             Ulnar nerve glide             Seated sciatic nerve tensioner - TKE             Supine sciatic nerve glide - anlke pump                                       Ther Ex    pulleys             Seated cervical retraction              Seated cervical retraction with extension              Seated scapular retraction with TB             Standing shoulder ext and row with TB                                                                                           Ther Activity                              Gait Training                              Modalities

## 2024-05-01 ENCOUNTER — APPOINTMENT (OUTPATIENT)
Dept: LAB | Facility: CLINIC | Age: 44
End: 2024-05-01
Payer: COMMERCIAL

## 2024-05-01 ENCOUNTER — OFFICE VISIT (OUTPATIENT)
Dept: PHYSICAL THERAPY | Facility: CLINIC | Age: 44
End: 2024-05-01
Payer: COMMERCIAL

## 2024-05-01 DIAGNOSIS — R20.2 PARESTHESIAS: Primary | ICD-10-CM

## 2024-05-01 DIAGNOSIS — R20.2 PARESTHESIAS: ICD-10-CM

## 2024-05-01 LAB — ANA SER QL IA: POSITIVE

## 2024-05-01 PROCEDURE — 97110 THERAPEUTIC EXERCISES: CPT

## 2024-05-01 PROCEDURE — 97112 NEUROMUSCULAR REEDUCATION: CPT

## 2024-05-01 PROCEDURE — 86235 NUCLEAR ANTIGEN ANTIBODY: CPT

## 2024-05-01 PROCEDURE — 86430 RHEUMATOID FACTOR TEST QUAL: CPT

## 2024-05-01 PROCEDURE — 86038 ANTINUCLEAR ANTIBODIES: CPT

## 2024-05-01 PROCEDURE — 86039 ANTINUCLEAR ANTIBODIES (ANA): CPT

## 2024-05-01 PROCEDURE — 36415 COLL VENOUS BLD VENIPUNCTURE: CPT

## 2024-05-01 PROCEDURE — 97140 MANUAL THERAPY 1/> REGIONS: CPT

## 2024-05-01 NOTE — PROGRESS NOTES
Daily Note     Today's date: 2024  Patient name: Eve Crowley  : 1980  MRN: [id number]  Referring provider: Anna Lynn,*  Dx:   Encounter Diagnosis     ICD-10-CM    1. Paresthesias  R20.2                      Subjective: Pt states that she is doing alright today. Reports that she did notice some numbness and tingling in her feet yesterday following the exercises. States that she has noticed a little less 'brain fog' the last 2 days despite not sleeping well at night. Feels that her head/neck discomfort is more anterior than I has been typically.       Objective: See treatment diary below      Assessment: Tolerated treatment well. Manual techniques were performed to increase cervical muscular mobility and to decrease pain threshold within tolerance. Reported increased numbness to head jaw to chin. Activities were also performed to increase neural length and mobility.  Exercises were progressed to address postural endurance and activation; added to HEP as noted below. Pt reported increase R UE numbness and tingling with standing pectoralis stretch at doorway. Recommended to continue with previous HEP as well. Patient demonstrated fatigue post treatment and would benefit from continued PT      Plan: Continue per plan of care.      Precautions: Tachycardia     Daily Treatment Diary    Date            FOTO IE -             Re-Eval IE               Manuals    UT, LS, SOR STM   JM           Photo biomodulation  JM - neck/pinch nerve protocol 10 W (B) 2x                                     Neuro Re-Ed     Median nerve glide  10x (B)           Median nerve floss - prayer  10x (B)           Ulnar nerve glide  10x (B)           Seated sciatic nerve tensioner - TKE  10x (B)           Supine sciatic nerve glide - anlke pump  10x (B)                                     Ther Ex    pulleys             Seated cervical retraction   30x HEP          Seated cervical retraction with extension   NV         "   Seated scapular retraction with TB  NV           Standing shoulder ext and row with TB  Green TB 2x10 ea  HEP          Standing pec stretch at doorway  10\" hold 3x HEP          Standing wall angels  5x                                                               Ther Activity                              Gait Training                              Modalities                                       "

## 2024-05-02 ENCOUNTER — TELEPHONE (OUTPATIENT)
Age: 44
End: 2024-05-02

## 2024-05-02 LAB
ENA SCL70 AB SER-ACNC: 1.3 AI (ref 0–0.9)
RHEUMATOID FACT SER QL LA: NEGATIVE

## 2024-05-02 NOTE — TELEPHONE ENCOUNTER
Patient called in to schedule a consult. I advised her that we need a referral first in order to schedule. She said she will get this done.

## 2024-05-03 LAB
ANA HOMOGEN SER QL IF: NORMAL
ANA HOMOGEN TITR SER: NORMAL {TITER}

## 2024-05-07 ENCOUNTER — OFFICE VISIT (OUTPATIENT)
Dept: GASTROENTEROLOGY | Facility: MEDICAL CENTER | Age: 44
End: 2024-05-07
Payer: COMMERCIAL

## 2024-05-07 ENCOUNTER — APPOINTMENT (OUTPATIENT)
Dept: LAB | Facility: MEDICAL CENTER | Age: 44
End: 2024-05-07
Payer: COMMERCIAL

## 2024-05-07 ENCOUNTER — ANESTHESIA EVENT (OUTPATIENT)
Dept: ANESTHESIOLOGY | Facility: HOSPITAL | Age: 44
End: 2024-05-07

## 2024-05-07 ENCOUNTER — ANESTHESIA (OUTPATIENT)
Dept: ANESTHESIOLOGY | Facility: HOSPITAL | Age: 44
End: 2024-05-07

## 2024-05-07 ENCOUNTER — TELEPHONE (OUTPATIENT)
Dept: GASTROENTEROLOGY | Facility: MEDICAL CENTER | Age: 44
End: 2024-05-07

## 2024-05-07 VITALS
HEART RATE: 106 BPM | HEIGHT: 63 IN | OXYGEN SATURATION: 99 % | SYSTOLIC BLOOD PRESSURE: 129 MMHG | DIASTOLIC BLOOD PRESSURE: 83 MMHG | BODY MASS INDEX: 28.77 KG/M2 | WEIGHT: 162.4 LBS | TEMPERATURE: 98.1 F

## 2024-05-07 DIAGNOSIS — R10.13 EPIGASTRIC PAIN: Primary | ICD-10-CM

## 2024-05-07 DIAGNOSIS — R63.4 UNINTENTIONAL WEIGHT LOSS: ICD-10-CM

## 2024-05-07 DIAGNOSIS — R10.13 EPIGASTRIC PAIN: ICD-10-CM

## 2024-05-07 DIAGNOSIS — R11.0 NAUSEA: ICD-10-CM

## 2024-05-07 LAB
ALBUMIN SERPL BCP-MCNC: 4.8 G/DL (ref 3.5–5)
ALP SERPL-CCNC: 46 U/L (ref 34–104)
ALT SERPL W P-5'-P-CCNC: 17 U/L (ref 7–52)
ANION GAP SERPL CALCULATED.3IONS-SCNC: 10 MMOL/L (ref 4–13)
AST SERPL W P-5'-P-CCNC: 15 U/L (ref 13–39)
BASOPHILS # BLD AUTO: 0.05 THOUSANDS/ÂΜL (ref 0–0.1)
BASOPHILS NFR BLD AUTO: 0 % (ref 0–1)
BILIRUB DIRECT SERPL-MCNC: 0.16 MG/DL (ref 0–0.2)
BILIRUB SERPL-MCNC: 0.68 MG/DL (ref 0.2–1)
BUN SERPL-MCNC: 9 MG/DL (ref 5–25)
CALCIUM SERPL-MCNC: 9.9 MG/DL (ref 8.4–10.2)
CHLORIDE SERPL-SCNC: 101 MMOL/L (ref 96–108)
CO2 SERPL-SCNC: 28 MMOL/L (ref 21–32)
CREAT SERPL-MCNC: 0.69 MG/DL (ref 0.6–1.3)
CRP SERPL QL: <1 MG/L
EOSINOPHIL # BLD AUTO: 0.04 THOUSAND/ÂΜL (ref 0–0.61)
EOSINOPHIL NFR BLD AUTO: 0 % (ref 0–6)
ERYTHROCYTE [DISTWIDTH] IN BLOOD BY AUTOMATED COUNT: 12.9 % (ref 11.6–15.1)
EST. AVERAGE GLUCOSE BLD GHB EST-MCNC: 105 MG/DL
GFR SERPL CREATININE-BSD FRML MDRD: 107 ML/MIN/1.73SQ M
GLUCOSE P FAST SERPL-MCNC: 100 MG/DL (ref 65–99)
HBA1C MFR BLD: 5.3 %
HCT VFR BLD AUTO: 47.3 % (ref 34.8–46.1)
HGB BLD-MCNC: 15.8 G/DL (ref 11.5–15.4)
IGA SERPL-MCNC: 200 MG/DL (ref 66–433)
IMM GRANULOCYTES # BLD AUTO: 0.03 THOUSAND/UL (ref 0–0.2)
IMM GRANULOCYTES NFR BLD AUTO: 0 % (ref 0–2)
LYMPHOCYTES # BLD AUTO: 2.59 THOUSANDS/ÂΜL (ref 0.6–4.47)
LYMPHOCYTES NFR BLD AUTO: 21 % (ref 14–44)
MCH RBC QN AUTO: 30.9 PG (ref 26.8–34.3)
MCHC RBC AUTO-ENTMCNC: 33.4 G/DL (ref 31.4–37.4)
MCV RBC AUTO: 92 FL (ref 82–98)
MONOCYTES # BLD AUTO: 0.7 THOUSAND/ÂΜL (ref 0.17–1.22)
MONOCYTES NFR BLD AUTO: 6 % (ref 4–12)
NEUTROPHILS # BLD AUTO: 8.85 THOUSANDS/ÂΜL (ref 1.85–7.62)
NEUTS SEG NFR BLD AUTO: 73 % (ref 43–75)
NRBC BLD AUTO-RTO: 0 /100 WBCS
PLATELET # BLD AUTO: 389 THOUSANDS/UL (ref 149–390)
PMV BLD AUTO: 11 FL (ref 8.9–12.7)
POTASSIUM SERPL-SCNC: 4.3 MMOL/L (ref 3.5–5.3)
PROT SERPL-MCNC: 7.9 G/DL (ref 6.4–8.4)
RBC # BLD AUTO: 5.12 MILLION/UL (ref 3.81–5.12)
SODIUM SERPL-SCNC: 139 MMOL/L (ref 135–147)
WBC # BLD AUTO: 12.26 THOUSAND/UL (ref 4.31–10.16)

## 2024-05-07 PROCEDURE — 80048 BASIC METABOLIC PNL TOTAL CA: CPT

## 2024-05-07 PROCEDURE — 36415 COLL VENOUS BLD VENIPUNCTURE: CPT

## 2024-05-07 PROCEDURE — 80076 HEPATIC FUNCTION PANEL: CPT

## 2024-05-07 PROCEDURE — 85025 COMPLETE CBC W/AUTO DIFF WBC: CPT

## 2024-05-07 PROCEDURE — 86140 C-REACTIVE PROTEIN: CPT

## 2024-05-07 PROCEDURE — 83036 HEMOGLOBIN GLYCOSYLATED A1C: CPT

## 2024-05-07 PROCEDURE — 86364 TISS TRNSGLTMNASE EA IG CLAS: CPT

## 2024-05-07 PROCEDURE — 82784 ASSAY IGA/IGD/IGG/IGM EACH: CPT

## 2024-05-07 PROCEDURE — 99244 OFF/OP CNSLTJ NEW/EST MOD 40: CPT | Performed by: INTERNAL MEDICINE

## 2024-05-07 NOTE — TELEPHONE ENCOUNTER
Procedure: EGD  Date: 05/14/2024  Physician performing: Dr. Bowling  Location of procedure:  [facility]  Instructions given to patient: Yes  Diabetic: No  Clearances: N/A

## 2024-05-07 NOTE — PROGRESS NOTES
[facility] - Outpatient Consultation  Eve Crowley 43 y.o. female MRN: [id number]  Encounter: [id number]          ASSESSMENT AND PLAN:      1. Epigastric pain  2. Nausea  3. Unintentional weight loss  Patient has unintentional weight loss of 20 pounds in the past 2 months.  Plan for CT to rule out any occult lesions.  She has epigastric pain.  Not associated with food intake.  Plan for EGD to rule out H. pylori, celiac disease.  Labs to evaluate for celiac disease and inflammatory bowel disease.   EGD; Future  IgA; Future  - Tissue transglutaminase, IgA; Future  - CBC and differential; Future  - Hemoglobin A1C; Future  - Basic metabolic panel; Future  - Hepatic function panel; Future  - C-reactive protein; Future  - Calprotectin,Fecal; Future  Follow-up after procedure.  ______________________________________________________________________    HPI: 43-year-old female referred for evaluation of unintentional weight loss epigastric pain and nausea.  Patient reported she has been having symptoms in the past several months.  She lost 20 pounds over the past 2 months.  She is currently being worked up for scleroderma.  She reported intermittent epigastric pain, not associated with food intake or bowel movement.  She also reported feeling persistent nausea.  She denies blood in the stool.  She reported overall regular bowel movements.  She moves her bowels every day but sometimes has intermittent loose stool.      REVIEW OF SYSTEMS:    CONSTITUTIONAL: Denies any fever, chills, rigors, and weight loss.  HEENT: No earache or tinnitus. Denies hearing loss or visual disturbances.  CARDIOVASCULAR: No chest pain or palpitations.   RESPIRATORY: Denies any cough, hemoptysis, shortness of breath or dyspnea on exertion.  GASTROINTESTINAL: As noted in the History of Present Illness.   GENITOURINARY: No problems with urination. Denies any hematuria or dysuria.  NEUROLOGIC: No dizziness or vertigo, denies  "headaches.   MUSCULOSKELETAL: Denies any muscle or joint pain.   SKIN: Denies skin rashes or itching.   ENDOCRINE: Denies excessive thirst. Denies intolerance to heat or cold.  PSYCHOSOCIAL: Denies depression or anxiety. Denies any recent memory loss.       Historical Information   Past Medical History:   Diagnosis Date    CTS (carpal tunnel syndrome)     Off and on problems     Past Surgical History:   Procedure Laterality Date     SECTION       Social History   Social History     Substance and Sexual Activity   Alcohol Use Not Currently    Alcohol/week: 2.0 - 3.0 standard drinks of alcohol    Types: 2 - 3 Glasses of wine per week    Comment: not in a while     Social History     Substance and Sexual Activity   Drug Use Not Currently    Types: Marijuana    Comment: CBD gummies for anxity but not often     Social History     Tobacco Use   Smoking Status Never   Smokeless Tobacco Never     Family History   Problem Relation Age of Onset    Cancer Mother         possible GI related    Dementia Paternal Grandmother        Meds/Allergies       Current Outpatient Medications:     Cholecalciferol 50 MCG ( UT) CAPS    MAGNESIUM PO    Omega-3 Fatty Acids (Omega-3 Fish Oil) 1000 MG CAPS    ondansetron (Zofran) 4 mg tablet    TiZANidine (ZANAFLEX) 2 MG capsule    CARBONYL IRON PO    Coenzyme Q10 50 MG CAPS    No Known Allergies        Objective     Blood pressure 129/83, pulse (!) 106, temperature 98.1 °F (36.7 °C), temperature source Tympanic, height 5' 3\" (1.6 m), weight 73.7 kg (162 lb 6.4 oz), SpO2 99%. Body mass index is 28.77 kg/m².        PHYSICAL EXAM:      General Appearance:   Alert, cooperative, no distress   HEENT:   Normocephalic, atraumatic, anicteric.     Neck:  Supple, symmetrical, trachea midline   Lungs:   Clear to auscultation bilaterally; no rales, rhonchi or wheezing; respirations unlabored    Heart::   Regular rate and rhythm; no murmur, rub, or gallop.   Abdomen:   Soft, non-tender, " non-distended; normal bowel sounds; no masses, no organomegaly    Genitalia:   Deferred    Rectal:   Deferred    Extremities:  No cyanosis, clubbing or edema    Pulses:  2+ and symmetric    Skin:  No jaundice, rashes, or lesions    Lymph nodes:  No palpable cervical lymphadenopathy        Lab Results:   No visits with results within 1 Day(s) from this visit.   Latest known visit with results is:   Appointment on 05/01/2024   Component Date Value    Scleroderma SCL-70 05/01/2024 1.3 (H)     KM 05/01/2024 Positive (A)     Rheumatoid Factor 05/01/2024 Negative     KM Titer 1 05/01/2024 Titer of 40     KM Pattern 1 05/01/2024 Un-typeable pattern          Radiology Results:   US thyroid    Result Date: 4/17/2024  Narrative: THYROID ULTRASOUND INDICATION: E04.1: Nontoxic single thyroid nodule. COMPARISON: None TECHNIQUE: Ultrasound of the thyroid was performed with a high frequency linear transducer in transverse and sagittal planes including volumetric imaging sweeps as well as traditional still imaging technique. FINDINGS: Thyroid texture: Normal homogeneous smooth echotexture. Right lobe: 5.5 x 1.5 x 1.7 cm. Volume 6.9 mL Left lobe: 4.8 x 1.2 x 1.6 cm. Volume 4.5 mL Isthmus: 0.3 cm. Nodule #1. Image 7. RIGHT midgland nodule measuring 1.7 x 0.8 x 0.9 cm. COMPOSITION: 2 points, solid or almost completely solid. ECHOGENICITY: 1 point, hyperechoic or isoechoic. SHAPE: 0 points, wider-than-tall. MARGIN: 0 points, smooth. ECHOGENIC FOCI: 0 points, none or large comet-tail artifacts. TI-RADS Classification: TR 3 (3 points), FNA if > 2.5 cm. Follow if > 1.5 cm..     Impression: No nodule meets current ACR criteria for requiring biopsy but follow-up ultrasound is recommended in 1 year. Reference: ACR Thyroid Imaging, Reporting and Data System (TI-RADS): White Paper of the ACR TI-RADS Committee. J AM Jovon Radiol 2017;14:587-595. Additional recommendations based on American Thyroid Association 2015 guidelines. Workstation  performed: [id number]     MRI cervical spine wo contrast    Result Date: 4/17/2024  Narrative: MRI CERVICAL SPINE WITHOUT CONTRAST INDICATION: R20.2: Paresthesia of skin M54.2: Cervicalgia M62.838: Other muscle spasm. COMPARISON:  None. TECHNIQUE:  Multiplanar, multisequence imaging of the cervical spine was performed. . IMAGE QUALITY:  Diagnostic FINDINGS: ALIGNMENT: There is nonspecific straightening of the cervical lordosis without subluxation. MARROW SIGNAL:  Normal marrow signal is identified within the visualized bony structures.  No discrete marrow lesion. CERVICAL AND VISUALIZED THORACIC CORD:  Normal signal within the visualized cord. PREVERTEBRAL AND PARASPINAL SOFT TISSUES: There is a 0.7 cm nodule in the right lobe of the thyroid gland, series 6 image 47. Incidental discovery of one or more thyroid nodule(s) measuring less than 1.5 cm and without suspicious features is noted in this patient who is above 35 years old; according to guidelines published in the February 2015 white paper on incidental thyroid nodules in the Journal of the American College of Radiology (JACR), no further evaluation is recommended. VISUALIZED POSTERIOR FOSSA:  The visualized posterior fossa demonstrates no abnormal signal. CERVICAL DISC SPACES: C2-C3:  Normal. C3-C4:  Normal. C4-C5:  Normal. C5-C6: There is a diffuse disk bulge.  No significant central canal or neural foraminal narrowing. C6-C7: There is a diffuse disk bulge.  No significant central canal or neural foraminal narrowing. C7-T1:  Normal. UPPER THORACIC DISC SPACES:  Normal. OTHER FINDINGS:  None.     Impression: 1. There is nonspecific straightening of the cervical lordosis without subluxation. 2. Minimal spondylosis. There is no focal disk herniation, central canal or neural foraminal narrowing. 3. No cord signal abnormality. No MR evidence of demyelinating disease. Workstation performed: [id number]

## 2024-05-07 NOTE — H&P (VIEW-ONLY)
[facility] - Outpatient Consultation  Eve Crowley 43 y.o. female MRN: [id number]  Encounter: [id number]          ASSESSMENT AND PLAN:      1. Epigastric pain  2. Nausea  3. Unintentional weight loss  Patient has unintentional weight loss of 20 pounds in the past 2 months.  Plan for CT to rule out any occult lesions.  She has epigastric pain.  Not associated with food intake.  Plan for EGD to rule out H. pylori, celiac disease.  Labs to evaluate for celiac disease and inflammatory bowel disease.   EGD; Future  IgA; Future  - Tissue transglutaminase, IgA; Future  - CBC and differential; Future  - Hemoglobin A1C; Future  - Basic metabolic panel; Future  - Hepatic function panel; Future  - C-reactive protein; Future  - Calprotectin,Fecal; Future  Follow-up after procedure.  ______________________________________________________________________    HPI: 43-year-old female referred for evaluation of unintentional weight loss epigastric pain and nausea.  Patient reported she has been having symptoms in the past several months.  She lost 20 pounds over the past 2 months.  She is currently being worked up for scleroderma.  She reported intermittent epigastric pain, not associated with food intake or bowel movement.  She also reported feeling persistent nausea.  She denies blood in the stool.  She reported overall regular bowel movements.  She moves her bowels every day but sometimes has intermittent loose stool.      REVIEW OF SYSTEMS:    CONSTITUTIONAL: Denies any fever, chills, rigors, and weight loss.  HEENT: No earache or tinnitus. Denies hearing loss or visual disturbances.  CARDIOVASCULAR: No chest pain or palpitations.   RESPIRATORY: Denies any cough, hemoptysis, shortness of breath or dyspnea on exertion.  GASTROINTESTINAL: As noted in the History of Present Illness.   GENITOURINARY: No problems with urination. Denies any hematuria or dysuria.  NEUROLOGIC: No dizziness or vertigo, denies  "headaches.   MUSCULOSKELETAL: Denies any muscle or joint pain.   SKIN: Denies skin rashes or itching.   ENDOCRINE: Denies excessive thirst. Denies intolerance to heat or cold.  PSYCHOSOCIAL: Denies depression or anxiety. Denies any recent memory loss.       Historical Information   Past Medical History:   Diagnosis Date    CTS (carpal tunnel syndrome)     Off and on problems     Past Surgical History:   Procedure Laterality Date     SECTION       Social History   Social History     Substance and Sexual Activity   Alcohol Use Not Currently    Alcohol/week: 2.0 - 3.0 standard drinks of alcohol    Types: 2 - 3 Glasses of wine per week    Comment: not in a while     Social History     Substance and Sexual Activity   Drug Use Not Currently    Types: Marijuana    Comment: CBD gummies for anxity but not often     Social History     Tobacco Use   Smoking Status Never   Smokeless Tobacco Never     Family History   Problem Relation Age of Onset    Cancer Mother         possible GI related    Dementia Paternal Grandmother        Meds/Allergies       Current Outpatient Medications:     Cholecalciferol 50 MCG ( UT) CAPS    MAGNESIUM PO    Omega-3 Fatty Acids (Omega-3 Fish Oil) 1000 MG CAPS    ondansetron (Zofran) 4 mg tablet    TiZANidine (ZANAFLEX) 2 MG capsule    CARBONYL IRON PO    Coenzyme Q10 50 MG CAPS    No Known Allergies        Objective     Blood pressure 129/83, pulse (!) 106, temperature 98.1 °F (36.7 °C), temperature source Tympanic, height 5' 3\" (1.6 m), weight 73.7 kg (162 lb 6.4 oz), SpO2 99%. Body mass index is 28.77 kg/m².        PHYSICAL EXAM:      General Appearance:   Alert, cooperative, no distress   HEENT:   Normocephalic, atraumatic, anicteric.     Neck:  Supple, symmetrical, trachea midline   Lungs:   Clear to auscultation bilaterally; no rales, rhonchi or wheezing; respirations unlabored    Heart::   Regular rate and rhythm; no murmur, rub, or gallop.   Abdomen:   Soft, non-tender, " non-distended; normal bowel sounds; no masses, no organomegaly    Genitalia:   Deferred    Rectal:   Deferred    Extremities:  No cyanosis, clubbing or edema    Pulses:  2+ and symmetric    Skin:  No jaundice, rashes, or lesions    Lymph nodes:  No palpable cervical lymphadenopathy        Lab Results:   No visits with results within 1 Day(s) from this visit.   Latest known visit with results is:   Appointment on 05/01/2024   Component Date Value    Scleroderma SCL-70 05/01/2024 1.3 (H)     KM 05/01/2024 Positive (A)     Rheumatoid Factor 05/01/2024 Negative     KM Titer 1 05/01/2024 Titer of 40     KM Pattern 1 05/01/2024 Un-typeable pattern          Radiology Results:   US thyroid    Result Date: 4/17/2024  Narrative: THYROID ULTRASOUND INDICATION: E04.1: Nontoxic single thyroid nodule. COMPARISON: None TECHNIQUE: Ultrasound of the thyroid was performed with a high frequency linear transducer in transverse and sagittal planes including volumetric imaging sweeps as well as traditional still imaging technique. FINDINGS: Thyroid texture: Normal homogeneous smooth echotexture. Right lobe: 5.5 x 1.5 x 1.7 cm. Volume 6.9 mL Left lobe: 4.8 x 1.2 x 1.6 cm. Volume 4.5 mL Isthmus: 0.3 cm. Nodule #1. Image 7. RIGHT midgland nodule measuring 1.7 x 0.8 x 0.9 cm. COMPOSITION: 2 points, solid or almost completely solid. ECHOGENICITY: 1 point, hyperechoic or isoechoic. SHAPE: 0 points, wider-than-tall. MARGIN: 0 points, smooth. ECHOGENIC FOCI: 0 points, none or large comet-tail artifacts. TI-RADS Classification: TR 3 (3 points), FNA if > 2.5 cm. Follow if > 1.5 cm..     Impression: No nodule meets current ACR criteria for requiring biopsy but follow-up ultrasound is recommended in 1 year. Reference: ACR Thyroid Imaging, Reporting and Data System (TI-RADS): White Paper of the ACR TI-RADS Committee. J AM Jovon Radiol 2017;14:587-595. Additional recommendations based on American Thyroid Association 2015 guidelines. Workstation  performed: [id number]     MRI cervical spine wo contrast    Result Date: 4/17/2024  Narrative: MRI CERVICAL SPINE WITHOUT CONTRAST INDICATION: R20.2: Paresthesia of skin M54.2: Cervicalgia M62.838: Other muscle spasm. COMPARISON:  None. TECHNIQUE:  Multiplanar, multisequence imaging of the cervical spine was performed. . IMAGE QUALITY:  Diagnostic FINDINGS: ALIGNMENT: There is nonspecific straightening of the cervical lordosis without subluxation. MARROW SIGNAL:  Normal marrow signal is identified within the visualized bony structures.  No discrete marrow lesion. CERVICAL AND VISUALIZED THORACIC CORD:  Normal signal within the visualized cord. PREVERTEBRAL AND PARASPINAL SOFT TISSUES: There is a 0.7 cm nodule in the right lobe of the thyroid gland, series 6 image 47. Incidental discovery of one or more thyroid nodule(s) measuring less than 1.5 cm and without suspicious features is noted in this patient who is above 35 years old; according to guidelines published in the February 2015 white paper on incidental thyroid nodules in the Journal of the American College of Radiology (JACR), no further evaluation is recommended. VISUALIZED POSTERIOR FOSSA:  The visualized posterior fossa demonstrates no abnormal signal. CERVICAL DISC SPACES: C2-C3:  Normal. C3-C4:  Normal. C4-C5:  Normal. C5-C6: There is a diffuse disk bulge.  No significant central canal or neural foraminal narrowing. C6-C7: There is a diffuse disk bulge.  No significant central canal or neural foraminal narrowing. C7-T1:  Normal. UPPER THORACIC DISC SPACES:  Normal. OTHER FINDINGS:  None.     Impression: 1. There is nonspecific straightening of the cervical lordosis without subluxation. 2. Minimal spondylosis. There is no focal disk herniation, central canal or neural foraminal narrowing. 3. No cord signal abnormality. No MR evidence of demyelinating disease. Workstation performed: [id number]

## 2024-05-08 ENCOUNTER — APPOINTMENT (OUTPATIENT)
Dept: LAB | Facility: CLINIC | Age: 44
End: 2024-05-08
Payer: COMMERCIAL

## 2024-05-08 ENCOUNTER — OFFICE VISIT (OUTPATIENT)
Dept: PHYSICAL THERAPY | Facility: CLINIC | Age: 44
End: 2024-05-08
Payer: COMMERCIAL

## 2024-05-08 DIAGNOSIS — R63.4 UNINTENTIONAL WEIGHT LOSS: ICD-10-CM

## 2024-05-08 DIAGNOSIS — R20.2 PARESTHESIAS: Primary | ICD-10-CM

## 2024-05-08 LAB — TTG IGA SER-ACNC: <2 U/ML (ref 0–3)

## 2024-05-08 PROCEDURE — 97110 THERAPEUTIC EXERCISES: CPT

## 2024-05-08 PROCEDURE — 97112 NEUROMUSCULAR REEDUCATION: CPT

## 2024-05-08 PROCEDURE — 97140 MANUAL THERAPY 1/> REGIONS: CPT

## 2024-05-08 PROCEDURE — 83993 ASSAY FOR CALPROTECTIN FECAL: CPT

## 2024-05-08 NOTE — PROGRESS NOTES
"Daily Note     Today's date: 2024  Patient name: Eve Crowley  : 1980  MRN: [id number]  Referring provider: Anna Lynn,*  Dx:   Encounter Diagnosis     ICD-10-CM    1. Paresthesias  R20.2                      Subjective: Pt states that she feels that her neck and back has been feeling a little better. States that the numbness and tingling has also been a little bit better with less intensity and less frequency. States that she still has been getting a lot of 'pressure' to the back of her skull and up her head around to her eyes. States that she still gets an occasional headaches. Feels that she is very tense and tied in her neck and shoulder causing more numbness and tingling.       Objective: See treatment diary below      Assessment: Tolerated treatment well. Manual techniques were performed to continue to increase cervical muscular mobility and to decrease pain threshold within tolerance. Reported increased numbness to chin today. Added resisted deep neck flexor endurance exercises and STM stretching activities today and to HEP. Recommended to continue with current  HEP.  Patient demonstrated fatigue post treatment and would benefit from continued PT      Plan: Continue per plan of care.      Precautions: Tachycardia     Daily Treatment Diary    Date           FOTO IE -             Re-Eval IE               Manuals    UT, LS, SOR STM   JM JM          Photo biomodulation  JM - neck/pinch nerve protocol 10 W (B) 2x JM - neck/pinch nerve protocol 11 W (B) 2x                                    Neuro Re-Ed     Median nerve glide  10x (B) 15x (B)          Median nerve floss - prayer  10x (B)           Ulnar nerve glide  10x (B)           Seated sciatic nerve tensioner - TKE  10x (B)           Supine sciatic nerve glide - anlke pump  10x (B)           Supine cervical retraction with head lift   4\" hold 10x                       Ther Ex    pulleys             Seated cervical retraction " "  30x 10x          Seated cervical retraction with extension   NV 3\" hold 10x          Seated scapular retraction with TB  NV NV          Standing shoulder ext and row with TB  Green TB 2x10 ea  Green TB 15x ea           Standing pec stretch at doorway  10\" hold 3x HEP          Standing wall angels  5x           Standing ER ISO with flexion   10x          Seated STM stretch    10\" hold 5x  HEP                                   Ther Activity                              Gait Training                              Modalities                                         "

## 2024-05-10 ENCOUNTER — OFFICE VISIT (OUTPATIENT)
Dept: PHYSICAL THERAPY | Facility: CLINIC | Age: 44
End: 2024-05-10
Payer: COMMERCIAL

## 2024-05-10 DIAGNOSIS — R20.2 PARESTHESIAS: Primary | ICD-10-CM

## 2024-05-10 PROCEDURE — 97140 MANUAL THERAPY 1/> REGIONS: CPT

## 2024-05-10 PROCEDURE — 97110 THERAPEUTIC EXERCISES: CPT

## 2024-05-10 NOTE — PROGRESS NOTES
"Daily Note     Today's date: 5/10/2024  Patient name: Eve Crowley  : 1980  MRN: [id number]  Referring provider: Anna Lynn,*  Dx:   Encounter Diagnosis     ICD-10-CM    1. Paresthesias  R20.2                      Subjective: Pt states that she is doing a little better today. Reports that she had significant side effects from the medication that she took yesterday for the first time. Indicates that she has a follow up with here PCP today. Did not complete the updated HEP yesterday due to the symptoms.       Objective: See treatment diary below      Assessment: Tolerated treatment well. Performed updated HEP today. Noted improvement with cervical retraction with head lift today compared to previous appointment. Continues to report increased tension with upper limb tension with median, radial and ulnar nerve glides. Additional dynamic postural strengthening activities were performed to continue to increase postural endurance with day to day activities. Patient demonstrated fatigue post treatment and would benefit from continued PT      Plan: Continue per plan of care.      Precautions: Tachycardia     Daily Treatment Diary    Date 4/29 5/1 5/8 5/10         FOTO IE -             Re-Eval IE               Manuals    UT, LS, SOR STM   [facility]         Photo biomodulation   - neck/pinch nerve protocol 10 W (B) 2x JM - neck/pinch nerve protocol 11 W (B) 2x JM - neck/pinch nerve protocol 12 W (B) 2x                                   Neuro Re-Ed     Median nerve glide  10x (B) 15x (B) 15x (B)         Median nerve floss - prayer  10x (B)           Ulnar nerve glide  10x (B)           Seated sciatic nerve tensioner - TKE  10x (B)           Supine sciatic nerve glide - anlke pump  10x (B)           Supine cervical retraction with head lift   4\" hold 10x 4\" hold 5x                      Ther Ex    pulleys             Seated cervical retraction   30x 10x 2x10         Seated cervical retraction with extension  " " NV 3\" hold 10x 2\" hold 2x10         Seated scapular retraction with TB  NV NV          Standing shoulder ext and row with TB  Green TB 2x10 ea  Green TB 15x ea           Standing pec stretch at doorway  10\" hold 3x HEP          Standing wall angels  5x           Standing ER ISO with flexion   10x 10x         Seated STM stretch    10\" hold 5x  HEP                                   Ther Activity                              Gait Training                              Modalities                                           "

## 2024-05-11 RX ORDER — SODIUM CHLORIDE 9 MG/ML
125 INJECTION, SOLUTION INTRAVENOUS CONTINUOUS
Status: CANCELLED | OUTPATIENT
Start: 2024-05-11

## 2024-05-13 ENCOUNTER — OFFICE VISIT (OUTPATIENT)
Dept: PHYSICAL THERAPY | Facility: CLINIC | Age: 44
End: 2024-05-13
Payer: COMMERCIAL

## 2024-05-13 DIAGNOSIS — R20.2 PARESTHESIAS: Primary | ICD-10-CM

## 2024-05-13 PROCEDURE — 97140 MANUAL THERAPY 1/> REGIONS: CPT

## 2024-05-13 PROCEDURE — 97112 NEUROMUSCULAR REEDUCATION: CPT

## 2024-05-13 PROCEDURE — 97110 THERAPEUTIC EXERCISES: CPT

## 2024-05-13 NOTE — PROGRESS NOTES
"Daily Note     Today's date: 2024  Patient name: Eve Crowley  : 1980  MRN: [id number]  Referring provider: Anna Lynn,*  Dx:   Encounter Diagnosis     ICD-10-CM    1. Paresthesias  R20.2                      Subjective: Pt states that she is doing a little better overall. Has less numbness and tingling overall with day to day and with the HEP. Feels that the exercises are also getting easier to perform as well.       Objective: See treatment diary below      Assessment: Tolerated treatment well. Manual techniques were performed to increase cervical mobility and to decrease pain. Reviewed HEP for form. Slight increase of resistance and dynamic activities were performed today to increase postural endurance and strength within tolerance. Reported mild numbness in her jaw/face with (B) shoulder 90/90 position. Recommended to continue with current HEP. Patient demonstrated fatigue post treatment and would benefit from continued PT      Plan: Continue per plan of care.      Precautions: Tachycardia     Daily Treatment Diary    Date 4/29 5/1 5/8 5/10 5/13        FOTO IE -             Re-Eval IE               Manuals    UT, LS, SOR STM   JM JM JM JM        Photo biomodulation  JM - neck/pinch nerve protocol 10 W (B) 2x JM - neck/pinch nerve protocol 11 W (B) 2x JM - neck/pinch nerve protocol 12 W (B) 2x JM - neck/pinch nerve protocol 12 W (B) 2x                                  Neuro Re-Ed     Median nerve glide  10x (B) 15x (B) 15x (B) 15x (B)        Median nerve floss - prayer  10x (B)           Ulnar nerve glide  10x (B)           Seated sciatic nerve tensioner - TKE  10x (B)           Supine sciatic nerve glide - anlke pump  10x (B)           Supine cervical retraction with head lift   4\" hold 10x 4\" hold 5x 4\" hold 5x                     Ther Ex    pulleys             Seated cervical retraction   30x 10x 2x10         Seated cervical retraction with extension   NV 3\" hold 10x 2\" hold 2x10 2\" " "hold 2x10        Seated scapular retraction with TB  NV NV          Standing shoulder ext and row with TB  Green TB 2x10 ea  Green TB 15x ea   Blue TB 15x ea         Standing pec stretch at doorway  10\" hold 3x HEP          Standing wall angels  5x   Robbers 3x6 pink TB         Standing ER ISO with flexion   10x 10x         Seated STM stretch    10\" hold 5x  HEP 10\" hold 5x         Serratus anterior foam roll ups     2x8                     Ther Activity                              Gait Training                              Modalities                                             "

## 2024-05-14 ENCOUNTER — HOSPITAL ENCOUNTER (OUTPATIENT)
Dept: GASTROENTEROLOGY | Facility: MEDICAL CENTER | Age: 44
Setting detail: OUTPATIENT SURGERY
Discharge: HOME | End: 2024-05-14
Attending: INTERNAL MEDICINE
Payer: COMMERCIAL

## 2024-05-14 DIAGNOSIS — R11.0 NAUSEA: ICD-10-CM

## 2024-05-14 DIAGNOSIS — R63.4 UNINTENTIONAL WEIGHT LOSS: ICD-10-CM

## 2024-05-14 DIAGNOSIS — R10.13 EPIGASTRIC PAIN: ICD-10-CM

## 2024-05-14 LAB
EXT PREGNANCY TEST URINE: NEGATIVE
EXT. CONTROL: NORMAL

## 2024-05-14 PROCEDURE — 81025 URINE PREGNANCY TEST: CPT | Performed by: ANESTHESIOLOGY

## 2024-05-14 RX ORDER — SODIUM CHLORIDE 9 MG/ML
125 INJECTION, SOLUTION INTRAVENOUS CONTINUOUS
Status: DISCONTINUED | OUTPATIENT
Start: 2024-05-14 | End: 2024-05-18 | Stop reason: HOSPADM

## 2024-05-14 NOTE — INTERVAL H&P NOTE
H&P reviewed. After examining the patient I find no changes in the patients condition since the H&P had been written.    There were no vitals filed for this visit.  
92

## 2024-05-15 ENCOUNTER — OFFICE VISIT (OUTPATIENT)
Dept: PHYSICAL THERAPY | Facility: CLINIC | Age: 44
End: 2024-05-15
Payer: COMMERCIAL

## 2024-05-15 DIAGNOSIS — R20.2 PARESTHESIAS: Primary | ICD-10-CM

## 2024-05-15 PROCEDURE — 97110 THERAPEUTIC EXERCISES: CPT

## 2024-05-15 PROCEDURE — 97140 MANUAL THERAPY 1/> REGIONS: CPT

## 2024-05-15 NOTE — PROGRESS NOTES
"Daily Note     Today's date: 5/15/2024  Patient name: Eve Crowley  : 1980  MRN: [id number]  Referring provider: Anna Lynn,*  Dx:   Encounter Diagnosis     ICD-10-CM    1. Paresthesias  R20.2                      Subjective: Pt states that she is doing alright today. Reports that she has been feeling better overall, but her  did say that she seems more fatigued this week. Feels that she is more stressed with all of her other medical testing and has had a little numbness in the R first ray.       Objective: See treatment diary below      Assessment: Tolerated treatment well. Additional postural strengthening activities were performed today. Pt reported crepitation with prone shoulder extension and horizontal abductor as well as 'pinching' with overhead activities; denied pain. Reported increase of numbness and tingling with prolonged overhead activities which improved with repetitions. Patient demonstrated fatigue post treatment and would benefit from continued PT      Plan: Continue per plan of care.      Precautions: Tachycardia     Daily Treatment Diary    Date 4/29 5/1 5/8 5/10 5/13 5/15       FOTO IE -             Re-Eval IE               Manuals    UT, LS, SOR STM   JM JM JM JM JM       Photo biomodulation  JM - neck/pinch nerve protocol 10 W (B) 2x JM - neck/pinch nerve protocol 11 W (B) 2x JM - neck/pinch nerve protocol 12 W (B) 2x JM - neck/pinch nerve protocol 12 W (B) 2x JM - neck/pinch nerve protocol 12 W (B) 2x                                 Neuro Re-Ed     Median nerve glide  10x (B) 15x (B) 15x (B) 15x (B) 15x (B)       Median nerve floss - prayer  10x (B)           Ulnar nerve glide  10x (B)           Seated sciatic nerve tensioner - TKE  10x (B)    15x (B)       Supine sciatic nerve glide - anlke pump  10x (B)           Supine cervical retraction with head lift   4\" hold 10x 4\" hold 5x 4\" hold 5x                     Ther Ex    pulleys             Seated cervical " "retraction   30x 10x 2x10  With OTB 2x10       Seated cervical retraction with extension   NV 3\" hold 10x 2\" hold 2x10 2\" hold 2x10        Seated scapular retraction with TB  NV NV          Standing shoulder ext and row with TB  Green TB 2x10 ea  Green TB 15x ea   Blue TB 15x ea         Standing pec stretch at doorway  10\" hold 3x HEP          Standing wall angels  5x   Robbers 3x6 pink TB  Robbers 3x6 pink TB     With 2 plygrd balls 3x8       Standing ER ISO with flexion   10x 10x         Seated STM stretch    10\" hold 5x  HEP 10\" hold 5x         Serratus anterior foam roll ups     2x8 Supine bench press (+) 2x15 7# DBs       Prone shoulder extension      2# 2x8 (B)       Prone shoulder abd      1# 2x8 (B)                                 Ther Activity                              Gait Training                              Modalities                                               "

## 2024-05-16 LAB — CALPROTECTIN STL-MCNT: 7 UG/G (ref 0–120)

## 2024-05-20 ENCOUNTER — OFFICE VISIT (OUTPATIENT)
Dept: PHYSICAL THERAPY | Facility: CLINIC | Age: 44
End: 2024-05-20
Payer: COMMERCIAL

## 2024-05-20 DIAGNOSIS — R20.2 PARESTHESIAS: Primary | ICD-10-CM

## 2024-05-20 PROCEDURE — 97140 MANUAL THERAPY 1/> REGIONS: CPT

## 2024-05-20 PROCEDURE — 97110 THERAPEUTIC EXERCISES: CPT

## 2024-05-20 NOTE — PROGRESS NOTES
"Daily Note     Today's date: 2024  Patient name: Eve Crowley  : 1980  MRN: [id number]  Referring provider: Anna Lynn,*  Dx:   Encounter Diagnosis     ICD-10-CM    1. Paresthesias  R20.2                      Subjective: Pt states that yesterday may have been her best day yet. Feels that she is starting to feel closer to normal and is having more energy level. Still has numbness into her index finger and has 'pressure' in her head as well as occasional tightness to each side of her neck.       Objective: See treatment diary below      Assessment: Tolerated treatment well. Manual techniques were performed to increase cervical mobility and to decrease pain. Focus continues to be placed on managing neural mobility and increase postural strength and endurance to limit stress to cervical and upper thoracic. Tolerated additional weight and resistance with activities today with reports of fatigue. Patient demonstrated fatigue post treatment and would benefit from continued PT      Plan: Continue per plan of care.      Precautions: Tachycardia     Daily Treatment Diary    Date 4/29 5/1 5/8 5/10 5/13 5/15 5/20      FOTO IE -             Re-Eval IE               Manuals    UT, LS, SOR STM   JM JM JM JM JM JM      Photo biomodulation  JM - neck/pinch nerve protocol 10 W (B) 2x JM - neck/pinch nerve protocol 11 W (B) 2x JM - neck/pinch nerve protocol 12 W (B) 2x JM - neck/pinch nerve protocol 12 W (B) 2x JM - neck/pinch nerve protocol 12 W (B) 2x JM - neck/pinch nerve protocol 12 W (B) 2x                                Neuro Re-Ed     Median nerve glide  10x (B) 15x (B) 15x (B) 15x (B) 15x (B) 15x (B)      Median nerve floss - prayer  10x (B)           Ulnar nerve glide  10x (B)           Seated sciatic nerve tensioner - TKE  10x (B)    15x (B) 15x (B)      Supine sciatic nerve glide - anlke pump  10x (B)           Supine cervical retraction with head lift   4\" hold 10x 4\" hold 5x 4\" hold 5x            " "         Ther Ex    pulleys             Seated cervical retraction   30x 10x 2x10  With OTB 2x10 With OTB 2x10    Milk maid OTB 2x10      Seated cervical retraction with extension   NV 3\" hold 10x 2\" hold 2x10 2\" hold 2x10        Seated scapular retraction with TB  NV NV          Standing shoulder ext and row with TB  Green TB 2x10 ea  Green TB 15x ea   Blue TB 15x ea         Standing pec stretch at doorway  10\" hold 3x HEP          Standing wall angels  5x   Robbers 3x6 pink TB  Robbers 3x6 pink TB     With 2 plygrd balls 3x8 Robbers 3x6 pink TB       Standing ER ISO with flexion   10x 10x         Seated STM stretch    10\" hold 5x  HEP 10\" hold 5x         Serratus anterior foam roll ups     2x8 Supine bench press (+) 2x15 7# DBs 3x6 with OTB      Prone shoulder extension      2# 2x8 (B) 2# 2x10 (B)      Prone shoulder abd      1# 2x8 (B) 2# 2x6 (B)      Bent over rows       8# 2x10                   Ther Activity                              Gait Training                              Modalities                                                 "

## 2024-05-21 ENCOUNTER — HOSPITAL ENCOUNTER (OUTPATIENT)
Dept: CT IMAGING | Facility: HOSPITAL | Age: 44
Discharge: HOME | End: 2024-05-21
Attending: INTERNAL MEDICINE
Payer: COMMERCIAL

## 2024-05-21 DIAGNOSIS — R63.4 UNINTENTIONAL WEIGHT LOSS: ICD-10-CM

## 2024-05-21 PROCEDURE — 74177 CT ABD & PELVIS W/CONTRAST: CPT

## 2024-05-21 RX ADMIN — IOHEXOL 100 ML: 350 INJECTION, SOLUTION INTRAVENOUS at 11:32

## 2024-05-22 ENCOUNTER — OFFICE VISIT (OUTPATIENT)
Dept: PHYSICAL THERAPY | Facility: CLINIC | Age: 44
End: 2024-05-22
Payer: COMMERCIAL

## 2024-05-22 DIAGNOSIS — R20.2 PARESTHESIAS: Primary | ICD-10-CM

## 2024-05-22 PROCEDURE — 97110 THERAPEUTIC EXERCISES: CPT

## 2024-05-22 PROCEDURE — 97140 MANUAL THERAPY 1/> REGIONS: CPT

## 2024-05-22 NOTE — PROGRESS NOTES
"Daily Note     Today's date: 2024  Patient name: Eve Crowley  : 1980  MRN: [id number]  Referring provider: Anna Lynn,*  Dx:   Encounter Diagnosis     ICD-10-CM    1. Paresthesias  R20.2                      Subjective: Pt states that she has been having more nausea and has had a headache over the last 2 days on the top of her head. Still seeing improvements with her numbness and tingling overall.       Objective: See treatment diary below      Assessment: Tolerated treatment well. Manual techniques were performed to address limited cervical mobility and to decrease pain. Pt reported increased tenderness with suboccipital palpation (R>L). Focus continues to be placed on managing neural mobility and increase postural strength and endurance. Progressed postural strengthening with (B) activities against gravity. Recommended to continue with current HEP. Patient demonstrated fatigue post treatment and would benefit from continued PT      Plan: Continue per plan of care.      Precautions: Tachycardia     Daily Treatment Diary    Date 4/29 5/1 5/8 5/10 5/13 5/15 5/20 5/22     FOTO IE -             Re-Eval IE               Manuals    UT, LS, SOR STM   JM JM JM JM JM JM JM     Photo biomodulation  JM - neck/pinch nerve protocol 10 W (B) 2x JM - neck/pinch nerve protocol 11 W (B) 2x JM - neck/pinch nerve protocol 12 W (B) 2x JM - neck/pinch nerve protocol 12 W (B) 2x JM - neck/pinch nerve protocol 12 W (B) 2x JM - neck/pinch nerve protocol 12 W (B) 2x JM - neck/pinch nerve protocol 12 W (B) 2x                               Neuro Re-Ed     Median nerve glide  10x (B) 15x (B) 15x (B) 15x (B) 15x (B) 15x (B) 15x (B)     Median nerve floss - prayer  10x (B)           Ulnar nerve glide  10x (B)           Seated sciatic nerve tensioner - TKE  10x (B)    15x (B) 15x (B) 15x (B)     Supine sciatic nerve glide - anlke pump  10x (B)           Supine cervical retraction with head lift   4\" hold 10x 4\" hold 5x " "4\" hold 5x                     Ther Ex    pulleys             Seated cervical retraction   30x 10x 2x10  With OTB 2x10 With OTB 2x10    Milk maid OTB 2x10      Seated cervical retraction with extension   NV 3\" hold 10x 2\" hold 2x10 2\" hold 2x10   Prone on Tball 2x10     Seated scapular retraction with TB  NV NV          Standing shoulder ext and row with TB  Green TB 2x10 ea  Green TB 15x ea   Blue TB 15x ea         Standing pec stretch at doorway  10\" hold 3x HEP          Standing wall angels  5x   Robbers 3x6 pink TB  Robbers 3x6 pink TB     With 2 plygrd balls 3x8 Robbers 3x6 pink TB  Robbers 3x6 pink TB      Standing ER ISO with flexion   10x 10x    OTB 3x8     Seated STM stretch    10\" hold 5x  HEP 10\" hold 5x         Serratus anterior foam roll ups     2x8 Supine bench press (+) 2x15 7# DBs 3x6 with OTB 3x6 with OTB     Prone shoulder extension      2# 2x8 (B) 2# 2x10 (B) On Tball (B) 1# 2x8     Prone shoulder abd      1# 2x8 (B) 2# 2x6 (B) On Tball (B) 0# 2x8     Bent over rows       8# 2x10 NV                  Ther Activity                              Gait Training                              Modalities                                                   "

## 2024-05-29 ENCOUNTER — OFFICE VISIT (OUTPATIENT)
Dept: PHYSICAL THERAPY | Facility: CLINIC | Age: 44
End: 2024-05-29
Payer: COMMERCIAL

## 2024-05-29 DIAGNOSIS — R20.2 PARESTHESIAS: Primary | ICD-10-CM

## 2024-05-29 PROCEDURE — 97110 THERAPEUTIC EXERCISES: CPT

## 2024-05-29 PROCEDURE — 97140 MANUAL THERAPY 1/> REGIONS: CPT

## 2024-05-29 NOTE — PROGRESS NOTES
Daily Note     Today's date: 2024  Patient name: Eve Crowley  : 1980  MRN: [id number]  Referring provider: Anna Lynn,*  Dx:   Encounter Diagnosis     ICD-10-CM    1. Paresthesias  R20.2                      Subjective: Pt states that she has been feeling a little better over the last few days to the point of performing some house work. As a results, she feels very sore and tight to the sides and back of her neck.       Objective: See treatment diary below      Assessment: Tolerated treatment well. Progressed dynamic postural strengthening activities today against gravity. Progressed activities with increased repetitions and with (B) UE. Occasional cueing given to limit increase shoulder elevation with activities. Recommended to progress postural endurance and strength at home with HEP. Education was given on limited increase shoulder elevation with ADLs. Patient demonstrated fatigue post treatment and would benefit from continued PT      Plan: Continue per plan of care.      Precautions: Tachycardia     Daily Treatment Diary    Date 4/29 5/1 5/8 5/10 5/13 5/15 5/20 5/22 5/29    FOTO IE -             Re-Eval IE               Manuals    UT, LS, SOR STM   JM JM JM JM JM JM JM JM    Photo biomodulation  JM - neck/pinch nerve protocol 10 W (B) 2x JM - neck/pinch nerve protocol 11 W (B) 2x JM - neck/pinch nerve protocol 12 W (B) 2x JM - neck/pinch nerve protocol 12 W (B) 2x JM - neck/pinch nerve protocol 12 W (B) 2x JM - neck/pinch nerve protocol 12 W (B) 2x JM - neck/pinch nerve protocol 12 W (B) 2x JM - neck/pinch nerve protocol 14 W (B) 2x                              Neuro Re-Ed     Median nerve glide  10x (B) 15x (B) 15x (B) 15x (B) 15x (B) 15x (B) 15x (B) 15x (B)    Median nerve floss - prayer  10x (B)           Ulnar nerve glide  10x (B)           Seated sciatic nerve tensioner - TKE  10x (B)    15x (B) 15x (B) 15x (B) 15x (B)    Supine sciatic nerve glide - anlke pump  10x (B)          "  Supine cervical retraction with head lift   4\" hold 10x 4\" hold 5x 4\" hold 5x                     Ther Ex    pulleys             Seated cervical retraction   30x 10x 2x10  With OTB 2x10 With OTB 2x10    Milk maid OTB 2x10      Seated cervical retraction with extension   NV 3\" hold 10x 2\" hold 2x10 2\" hold 2x10   Prone on Tball 2x10     Seated scapular retraction with TB  NV NV          Standing shoulder ext and row with TB  Green TB 2x10 ea  Green TB 15x ea   Blue TB 15x ea     Rows only purple TB 2x15    Standing pec stretch at doorway  10\" hold 3x HEP          Standing wall angels  5x   Robbers 3x6 pink TB  Robbers 3x6 pink TB     With 2 plygrd balls 3x8 Robbers 3x6 pink TB  Robbers 3x6 pink TB      Standing ER ISO with flexion   10x 10x    OTB 3x8     Seated STM stretch    10\" hold 5x  HEP 10\" hold 5x         Serratus anterior foam roll ups     2x8 Supine bench press (+) 2x15 7# DBs 3x6 with OTB 3x6 with OTB Supine bench press (+) 3x10 8# DBs    Prone shoulder extension      2# 2x8 (B) 2# 2x10 (B) On Tball (B) 1# 2x8 On Tball (B) 1# 2x10    Prone shoulder abd      1# 2x8 (B) 2# 2x6 (B) On Tball (B) 0# 2x8 On Tball (B) 0# 2x8    Bent over rows       8# 2x10 NV                  Ther Activity                              Gait Training                              Modalities                                                     "

## 2024-05-31 ENCOUNTER — OFFICE VISIT (OUTPATIENT)
Dept: PHYSICAL THERAPY | Facility: CLINIC | Age: 44
End: 2024-05-31
Payer: COMMERCIAL

## 2024-05-31 DIAGNOSIS — R20.2 PARESTHESIAS: Primary | ICD-10-CM

## 2024-05-31 PROCEDURE — 97140 MANUAL THERAPY 1/> REGIONS: CPT

## 2024-05-31 PROCEDURE — 97110 THERAPEUTIC EXERCISES: CPT

## 2024-05-31 NOTE — PROGRESS NOTES
Daily Note     Today's date: 2024  Patient name: Eve Crowley  : 1980  MRN: [id number]  Referring provider: Anna Lynn,*  Dx:   Encounter Diagnosis     ICD-10-CM    1. Paresthesias  R20.2                      Subjective: Pt states that she is feeling a little tired today. Indicates that she stop taking the muscle relaxors a few days ago and does notice more cramping and tightness, especially at night.       Objective: See treatment diary below      Assessment: Tolerated treatment well. Activities were performed today without increase of resistance or repetitions with primary focus on form; occasional cueing given to limit increased shoulder elevation; able to self correct. Eve report cheek numbness with prone scapular retraction and flexion which resolved following activity. Recommended to continue with current HEP at this time. Patient demonstrated fatigue post treatment and would benefit from continued PT      Plan: Continue per plan of care.      Precautions: Tachycardia     Daily Treatment Diary    Date 4/29 5/1 5/8 5/10 5/13 5/15 5/20 5/22 5/29 5/31   FOTO IE -             Re-Eval IE               Manuals    UT, LS, SOR STM   JM JM JM [facility] JM   Photo biomodulation  JM - neck/pinch nerve protocol 10 W (B) 2x JM - neck/pinch nerve protocol 11 W (B) 2x JM - neck/pinch nerve protocol 12 W (B) 2x JM - neck/pinch nerve protocol 12 W (B) 2x JM - neck/pinch nerve protocol 12 W (B) 2x JM - neck/pinch nerve protocol 12 W (B) 2x JM - neck/pinch nerve protocol 12 W (B) 2x JM - neck/pinch nerve protocol 14 W (B) 2x JM - neck/pinch nerve protocol 14 W (B) 2x                             Neuro Re-Ed     Median nerve glide  10x (B) 15x (B) 15x (B) 15x (B) 15x (B) 15x (B) 15x (B) 15x (B) 15x (B)   Median nerve floss - prayer  10x (B)           Ulnar nerve glide  10x (B)           Seated sciatic nerve tensioner - TKE  10x (B)    15x (B) 15x (B) 15x (B) 15x (B) 15x (B)   Supine sciatic nerve  "glide - anlke pump  10x (B)           Supine cervical retraction with head lift   4\" hold 10x 4\" hold 5x 4\" hold 5x                     Ther Ex    pulleys             Seated cervical retraction   30x 10x 2x10  With OTB 2x10 With OTB 2x10    Milk maid OTB 2x10      Seated cervical retraction with extension   NV 3\" hold 10x 2\" hold 2x10 2\" hold 2x10   Prone on Tball 2x10  Prone with flexion    Seated scapular retraction with TB  NV NV          Standing shoulder ext and row with TB  Green TB 2x10 ea  Green TB 15x ea   Blue TB 15x ea     Rows only purple TB 2x15    Standing pec stretch at doorway  10\" hold 3x HEP          Standing wall angels  5x   Robbers 3x6 pink TB  Robbers 3x6 pink TB     With 2 plygrd balls 3x8 Robbers 3x6 pink TB  Robbers 3x6 pink TB   Robbers 3x8 pink TB    Standing ER ISO with flexion   10x 10x    OTB 3x8  Pink TB 2x8   Seated STM stretch    10\" hold 5x  HEP 10\" hold 5x         Serratus anterior foam roll ups     2x8 Supine bench press (+) 2x15 7# DBs 3x6 with OTB 3x6 with OTB Supine bench press (+) 3x10 8# DBs Supine bench press (+) 3x10 8# DBs   Prone shoulder extension      2# 2x8 (B) 2# 2x10 (B) On Tball (B) 1# 2x8 On Tball (B) 1# 2x10 On Tball (B) 1# 2x10   Prone shoulder abd      1# 2x8 (B) 2# 2x6 (B) On Tball (B) 0# 2x8 On Tball (B) 0# 2x8 On Tball (B) 0# 2x10   Bent over rows       8# 2x10 NV                  Ther Activity                              Gait Training                              Modalities                                                       "

## 2024-06-03 ENCOUNTER — APPOINTMENT (OUTPATIENT)
Dept: PHYSICAL THERAPY | Facility: CLINIC | Age: 44
End: 2024-06-03
Payer: COMMERCIAL

## 2024-06-06 ENCOUNTER — OFFICE VISIT (OUTPATIENT)
Dept: PHYSICAL THERAPY | Facility: CLINIC | Age: 44
End: 2024-06-06
Payer: COMMERCIAL

## 2024-06-06 DIAGNOSIS — R20.2 PARESTHESIAS: Primary | ICD-10-CM

## 2024-06-06 PROCEDURE — 97110 THERAPEUTIC EXERCISES: CPT

## 2024-06-06 PROCEDURE — 97140 MANUAL THERAPY 1/> REGIONS: CPT

## 2024-06-06 PROCEDURE — 97112 NEUROMUSCULAR REEDUCATION: CPT

## 2024-06-06 NOTE — PROGRESS NOTES
Daily Note     Today's date: 2024  Patient name: Eve Crowley  : 1980  MRN: [id number]  Referring provider: Anna Lynn,*  Dx:   Encounter Diagnosis     ICD-10-CM    1. Paresthesias  R20.2                      Subjective: Pt states that she is doing well today. Indicates that she has not been having as much symptoms with activities that she feels that would have previously been uncomfortable or would have cause numbness. Did feel tight and sore yesterday, mostly to the lateral aspect of the neck.       Objective: See treatment diary below      Assessment: Tolerated treatment well. Activities were performed to continue to increase upward scapular rotator endurance and activation. Cueing given to limit levator scapula and upper trapezius activation. Pt reported 'clicking' with parascapular strengthening activities on the R scapula around the superiomedial border; did not limit activity participation. Patient demonstrated fatigue post treatment and would benefit from continued PT      Plan: Continue per plan of care.      Precautions: Tachycardia     Daily Treatment Diary    Date 6/6     5/15 5/20 5/22 5/29 5/31   FOTO             Re-Eval                Manuals    UT, LS, SOR STM  JM     JM JM JM JM JM   Photo biomodulation JM - neck/pinch nerve protocol 13 W (B) 2x     JM - neck/pinch nerve protocol 12 W (B) 2x JM - neck/pinch nerve protocol 12 W (B) 2x JM - neck/pinch nerve protocol 12 W (B) 2x JM - neck/pinch nerve protocol 14 W (B) 2x JM - neck/pinch nerve protocol 14 W (B) 2x                             Neuro Re-Ed     Median nerve glide 15x (B)     15x (B) 15x (B) 15x (B) 15x (B) 15x (B)   Median nerve floss - prayer             Ulnar nerve glide             Seated sciatic nerve tensioner - TKE 15x (B)     15x (B) 15x (B) 15x (B) 15x (B) 15x (B)   Supine sciatic nerve glide - anlke pump             Supine cervical retraction with head lift In quadruped 3x10                          Ther Ex  "   pulleys             Seated cervical retraction       With OTB 2x10 With OTB 2x10    Milk maid OTB 2x10      Seated cervical retraction with extension         Prone on Tball 2x10  Prone with flexion    Seated scapular retraction with TB             Standing shoulder ext and row with TB         Rows only purple TB 2x15    Standing pec stretch at doorway             Standing wall angels Robbers 3x8 pink TB      Robbers 3x6 pink TB     With 2 plygrd balls 3x8 Robbers 3x6 pink TB  Robbers 3x6 pink TB   Robbers 3x8 pink TB    Standing ER ISO with flexion Pink TB 2x8       OTB 3x8  Pink TB 2x8   Seated STM stretch              Serratus anterior foam roll ups Supine bench press (+) 3x10 8# DBs     Supine bench press (+) 2x15 7# DBs 3x6 with OTB 3x6 with OTB Supine bench press (+) 3x10 8# DBs Supine bench press (+) 3x10 8# DBs   Prone shoulder extension      2# 2x8 (B) 2# 2x10 (B) On Tball (B) 1# 2x8 On Tball (B) 1# 2x10 On Tball (B) 1# 2x10   Prone shoulder abd      1# 2x8 (B) 2# 2x6 (B) On Tball (B) 0# 2x8 On Tball (B) 0# 2x8 On Tball (B) 0# 2x10   Prone shoulder \"W's\" with flexion  3x6 on TBall            Bent over rows 8# 2x15      8# 2x10 NV                  Ther Activity                              Gait Training                              Modalities                                                         "

## 2024-06-10 ENCOUNTER — APPOINTMENT (OUTPATIENT)
Dept: PHYSICAL THERAPY | Facility: CLINIC | Age: 44
End: 2024-06-10
Payer: COMMERCIAL

## 2024-06-12 ENCOUNTER — EVALUATION (OUTPATIENT)
Dept: PHYSICAL THERAPY | Facility: CLINIC | Age: 44
End: 2024-06-12
Payer: COMMERCIAL

## 2024-06-12 DIAGNOSIS — R20.2 PARESTHESIAS: Primary | ICD-10-CM

## 2024-06-12 PROCEDURE — 97140 MANUAL THERAPY 1/> REGIONS: CPT

## 2024-06-12 PROCEDURE — 97110 THERAPEUTIC EXERCISES: CPT

## 2024-06-12 NOTE — PROGRESS NOTES
PT Re-Evaluation     Today's date: 2024  Patient name: Eve Crowley  : 1980  MRN: [id number]  Referring provider: Anna Lynn,*  Dx:   Encounter Diagnosis     ICD-10-CM    1. Paresthesias  R20.2                        Assessment  Impairments: abnormal or restricted ROM, activity intolerance, impaired physical strength, lacks appropriate home exercise program, pain with function, scapular dyskinesis, poor posture  and poor body mechanics  Symptom irritability: low    Assessment details: Pt is a 43 y.o. year old female that presents to outpatient physical therapy with paresthesias. Since starting physical therapy, patient reported significant decrease of numbness and tingling with ULTT to (B) upper extremities the median, ulnar and radial nerves as well as numbness to her face, ears, and mouth with nerve length testing. Eve also exhibits decrease tone of upper traps, levator scaps, and suboccipitals, with decreased strength of deep neck flexors and scapular retractors. While she has been progressing well toward personal and LTGs, she continues to report decreased activity tolerance and decreased functional activity due to pain. Noted limitations to bilateral scapular upward rotators and retractors. Still benefiting from outpatient vs home physical therapy only for use of specialized equipment, skilled progressions,  and manual therapy. Pt would benefit from continued skilled physical therapy to address noted impairments, meet patient's goals, and to return to PLOF.   Thank you for this referral.    Understanding of Dx/Px/POC: good     Prognosis: fair    Goals  STGs: (updated 2024)  1. Pt will be able to demonstrate an increase of strength by at least 1/2 grade within 4 weeks (met)  2. Pt will be able to achieve increased ROM by at least 25% within 4 weeks. (Met)  3. Pt will be able to demonstrate improved deep neck flexor endurance by at least 10 seconds within 4 weeks (met - new  goal)  4. Pt will be able to report no radicular symptoms below glenohumeral region within 4 weeks. (Progressing well)  5. Pt will be able to demonstrate improve cervical and thoracic PA joint mobility to WNL without symptoms within 4 weeks. (Met)    6. Pt will increase latissimus and rhomboid strength to at least 4+/5 bilaterally within 4 weeks (new goal)    LTGs: (progressing well)  1. Pt will be independent with all IADLs without pain or discomfort upon discharge.   2. Pt will be independent with HEP upon discharge   3. Pt will be able to report no pain or discomfort with all work duties and recreational activities for a full day upon discharge.  4. Pt will be able to report 'no difficulty' with heavy household activities such as cleaning or lifting at least 10 lbs to waist upon discharge         Plan  Patient would benefit from: PT eval and skilled physical therapy  Planned modality interventions: low level laser therapy, cryotherapy, electrical stimulation/Russian stimulation, iontophoresis, traction, ultrasound, unattended electrical stimulation, TENS and thermotherapy: hydrocollator packs    Planned therapy interventions: abdominal trunk stabilization, joint mobilization, manual therapy, massage, muscle pump exercises, neuromuscular re-education, patient education, postural training, strengthening, stretching, therapeutic activities, therapeutic exercise, therapeutic training, home exercise program, functional ROM exercises, flexibility, breathing training, body mechanics training, behavior modification, balance, activity modification, IADL retraining, nerve gliding and sensory integrative techniques    Frequency: 1x week  Duration in weeks: 6  Treatment plan discussed with: patient      Subjective Evaluation    History of Present Illness  Mechanism of injury: Pt states that she has been having neck pain/discomfort as well as face and UE numbness and tingling for the past few months. Patient reports that  symptoms started on 24. The few days prior had been visiting a friend and sleeping on a different mattress and pillow. She started to have neck pain the day prior to the onset of facial numbness. Her friend gave her a device to rub on her neck which felt good, however, when she used it along the spinous processes, she had sudden onset of dizziness (lightheadedness) and tachycardia. The next day, she started to have bilateral facial numbness and when to the hospital. Due to full body numbness/tingling.     Since being discharged, she has continued to have tingling in her face but it is improving slowly. It is now intermittent rather that constant. She continues to also have tingling in the right index finger. Intermittently feels lightheaded and has intermittent tachycardia.    Has been going to a chiropractor a few times.    Now primary complaints of suboccipital pressure, neck tightness and occasional numbness in her face, UE and back that seems to be random.     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 Indicates that they have had less pain overall with less intensity. Pt also reports much less numbness and tingling in her jaw and face with activities and not much numbness down her arms into hands. Reports that she has been involved with day to day activities with less discomfort. Feels that her endurance is the main thing that is limiting her from the PLOF is the endurance aspect of her day to day.   Patient Goals  Patient goals for therapy: decreased pain and return to sport/leisure activities    Pain  Current pain ratin  At best pain ratin  At worst pain ratin  Quality:  tight, pulling and dull ache      Objective     Static Posture     Head  Forward.    Shoulders  Elevated and rounded.    Scapulae  Left winging, right winging, left elevated and right elevated.    Thoracic Spine  Flattened thoracic spine.    Palpation   Left   Hypertonic in the cervical paraspinals and levator scapulae.   Tenderness of the cervical paraspinals, latissimus, levator scapulae, middle trapezius, scalenes and upper trapezius.     Right   Hypertonic in the cervical paraspinals, levator scapulae, suboccipitals and upper trapezius.   Tenderness of the cervical paraspinals, latissimus, levator scapulae, middle trapezius, scalenes, suboccipitals and upper trapezius.   Trigger point to suboccipitals.     Neurological Testing     Sensation   Cervical/Thoracic   Left   Intact: light touch    Right   Intact: light touch    Lumbar   Left   Intact: light touch    Right   Intact: light touch    Reflexes   Left   Biceps (C5/C6): normal (2+)  Brachioradialis (C6): normal (2+)  Triceps (C7): trace (1+)  Patellar (L4): brisk (3+)  Achilles (S1): normal (2+)  Wade's reflex: negative  Clonus sign: negative    Right   Biceps (C5/C6): normal (2+)  Brachioradialis (C6): normal (2+)  Triceps (C7): trace (1+)  Patellar (L4): brisk (3+)  Achilles (S1): normal (2+)  Wade's reflex: negative  Clonus sign: negative    Active Range of Motion   Cervical/Thoracic Spine       Cervical    Left lateral flexion: 22 degrees      Right lateral flexion: 28 degrees     with pain  Right rotation:  Restriction level: minimal    Strength/Myotome Testing     Left Shoulder     Planes of Motion   Flexion: 4+   Extension: 4+   Abduction: 4+   External rotation at 0°: 4+     Isolated Muscles   Latissimus: 4   Lower trapezius: 4   Rhomboids: 4-     Right Shoulder     Planes of Motion   Flexion: 4+   Extension: 4+   Abduction: 4+   External rotation at 0°: 4+     Isolated Muscles   Latissimus: 4   Lower trapezius: 4   Rhomboids: 4-     Left Elbow    Flexion: 4+  Extension: 4+    Right Elbow   Flexion: 4+  Extension: 4+    Left Wrist/Hand   Thumb extension: 4+    Right Wrist/Hand   Thumb extension: 4+    Tests   Cervical   Positive neck flexor muscle endurance test.    Thoracic   Negative slump test.     Left Shoulder   Positive ULTT1.   Negative ULTT3 and ULTT4.     Right Shoulder   Negative ULTT1, ULTT3 and ULTT4.     Lumbar     Left   Positive slump test.     Right   Positive slump test.     Additional Tests Details  DNF endurance: 30 seconds with loss of form   Neuro Exam:     Oculomotor exam   Oculomotor ROM: WNL  Resting nystagmus: not present   Gaze holding nystagmus: not present left  and not present right  Smooth pursuits: within normal limits           Precautions: Tachycardia     Daily Treatment Diary    Date 6/6 6/12    5/15 5/20 5/22 5/29 5/31   FOTO             Re-Eval                Manuals    UT, LS, SOR STM  JM JM    JM [facility]   Photo biomodulation JM - neck/pinch nerve protocol 13 W (B) 2x JM - neck/pinch nerve protocol 13 W (B) 2x    JM - neck/pinch nerve protocol 12 W (B) 2x JM - neck/pinch nerve protocol 12 W (B) 2x JM - neck/pinch nerve protocol 12 W (B) 2x JM - neck/pinch nerve protocol 14 W (B) 2x JM - neck/pinch nerve protocol 14 W (B) 2x                             Neuro Re-Ed     Median nerve glide 15x (B) 15x (B)    15x (B) 15x (B) 15x (B) 15x (B) 15x (B)   Median nerve floss - prayer             Ulnar nerve glide             Seated sciatic nerve tensioner - TKE 15x (B) 15x (B)    15x (B) 15x (B) 15x (B) 15x (B) 15x (B)   Supine sciatic nerve glide - anlke pump             Supine cervical retraction with head lift In quadruped 3x10                          Ther Ex    Seated cervical retraction       With OTB 2x10 With OTB 2x10    Milk maid OTB 2x10      Seated cervical retraction with extension         Prone on Tball 2x10  Prone with flexion    Seated scapular retraction with TB             Standing shoulder ext and row with TB  "        Rows only purple TB 2x15    Standing pec stretch at doorway             Standing wall angels Robbers 3x8 pink TB  Robbers 3x8 pink TB     Robbers 3x6 pink TB     With 2 plygrd balls 3x8 Robbers 3x6 pink TB  Robbers 3x6 pink TB   Robbers 3x8 pink TB    Standing ER ISO with flexion Pink TB 2x8       OTB 3x8  Pink TB 2x8   Seated STM stretch              Serratus anterior foam roll ups Supine bench press (+) 3x10 8# DBs     Supine bench press (+) 2x15 7# DBs 3x6 with OTB 3x6 with OTB Supine bench press (+) 3x10 8# DBs Supine bench press (+) 3x10 8# DBs   Prone shoulder extension  On Tball (B) 2# 2x10    2# 2x8 (B) 2# 2x10 (B) On Tball (B) 1# 2x8 On Tball (B) 1# 2x10 On Tball (B) 1# 2x10   Prone shoulder abd      1# 2x8 (B) 2# 2x6 (B) On Tball (B) 0# 2x8 On Tball (B) 0# 2x8 On Tball (B) 0# 2x10   Prone shoulder \"W's\" with flexion  3x6 on TBall 3x8 on TBall           Bent over rows 8# 2x15 8# 2x15     8# 2x10 NV                  Ther Activity                              Gait Training                              Modalities                                     "

## 2024-06-19 ENCOUNTER — OFFICE VISIT (OUTPATIENT)
Dept: PHYSICAL THERAPY | Facility: CLINIC | Age: 44
End: 2024-06-19
Payer: COMMERCIAL

## 2024-06-19 DIAGNOSIS — R20.2 PARESTHESIAS: Primary | ICD-10-CM

## 2024-06-19 PROCEDURE — 97110 THERAPEUTIC EXERCISES: CPT

## 2024-06-19 PROCEDURE — 97140 MANUAL THERAPY 1/> REGIONS: CPT

## 2024-06-19 PROCEDURE — 97112 NEUROMUSCULAR REEDUCATION: CPT

## 2024-06-19 NOTE — PROGRESS NOTES
Daily Note     Today's date: 2024  Patient name: Eve Crowley  : 1980  MRN: [id number]  Referring provider: Anna Lynn,*  Dx:   Encounter Diagnosis     ICD-10-CM    1. Paresthesias  R20.2                      Subjective: patient feels stressed and has a lot of tension in the neck and shoulders, she is traveling out of the country. She has been waking up with a lot of tension in the UE/shoulders.       Objective: See treatment diary below      Assessment: Tolerated treatment well. Patient continues to respond well to manual intervention, reporting reduction in cervical tension. Eev would benefit from continued PT to promote posterior chain strengthen and neuromuscular control, improve ROM at the C/s and T/s.     Plan: Continue per plan of care.       Precautions: Tachycardia     Daily Treatment Diary    Date 6/6 6/12 6/19   5/15 5/20 5/22 5/29 5/31   FOTO             Re-Eval                Manuals    UT, LS, SOR STM  JM JM MSB   JM JM JM JM JM   Photo biomodulation JM - neck/pinch nerve protocol 13 W (B) 2x JM - neck/pinch nerve protocol 13 W (B) 2x MSB - neck/pinch nerve protocol 13 W (B) 2x   JM - neck/pinch nerve protocol 12 W (B) 2x JM - neck/pinch nerve protocol 12 W (B) 2x JM - neck/pinch nerve protocol 12 W (B) 2x JM - neck/pinch nerve protocol 14 W (B) 2x JM - neck/pinch nerve protocol 14 W (B) 2x                             Neuro Re-Ed     Median nerve glide 15x (B) 15x (B) 15x (B)   15x (B) 15x (B) 15x (B) 15x (B) 15x (B)   Median nerve floss - prayer             Ulnar nerve glide             Seated sciatic nerve tensioner - TKE 15x (B) 15x (B) 15x (B)   15x (B) 15x (B) 15x (B) 15x (B) 15x (B)   Supine sciatic nerve glide - anlke pump             Supine cervical retraction with head lift In quadruped 3x10   in quadruped 2x10                        Ther Ex    Seated cervical retraction       With OTB 2x10 With OTB 2x10    Milk maid OTB 2x10      Seated cervical retraction with  "extension         Prone on Tball 2x10  Prone with flexion    Seated scapular retraction with TB             Standing shoulder ext and row with TB         Rows only purple TB 2x15    Standing pec stretch at doorway               Standing wall angels Robbers 3x8 pink TB  Robbers 3x8 pink TB  Robbers 3x8 pink TB    Robbers 3x6 pink TB     With 2 plygrd balls 3x8 Robbers 3x6 pink TB  Robbers 3x6 pink TB   Robbers 3x8 pink TB    Standing ER ISO with flexion Pink TB 2x8  Pink TB 2x8                               OTB 3x8  Pink TB 2x8   Seated STM stretch              Serratus anterior foam roll ups Supine bench press (+) 3x10 8# DBs  Supine bench press (+) 3x10 8# DBs   Supine bench press (+) 2x15 7# DBs 3x6 with OTB 3x6 with OTB Supine bench press (+) 3x10 8# DBs Supine bench press (+) 3x10 8# DBs   Prone shoulder extension  On Tball (B) 2# 2x10 On Tball (B) 2# 2x10   2# 2x8 (B) 2# 2x10 (B) On Tball (B) 1# 2x8 On Tball (B) 1# 2x10 On Tball (B) 1# 2x10   Prone shoulder abd      1# 2x8 (B) 2# 2x6 (B) On Tball (B) 0# 2x8 On Tball (B) 0# 2x8 On Tball (B) 0# 2x10   Prone shoulder \"W's\" with flexion  3x6 on TBall 3x8 on TBall 3x8 on TBall          Bent over rows 8# 2x15 8# 2x15 8# 2x15    8# 2x10 NV                  Ther Activity                                Gait Training                              Modalities                                     "

## 2024-06-24 ENCOUNTER — PREP FOR PROCEDURE (OUTPATIENT)
Dept: GASTROENTEROLOGY | Facility: CLINIC | Age: 44
End: 2024-06-24

## 2024-06-24 DIAGNOSIS — R63.4 UNINTENTIONAL WEIGHT LOSS: Primary | ICD-10-CM

## 2024-06-26 ENCOUNTER — TELEPHONE (OUTPATIENT)
Dept: GASTROENTEROLOGY | Facility: CLINIC | Age: 44
End: 2024-06-26

## 2024-06-26 NOTE — TELEPHONE ENCOUNTER
BHASKARM, to schedule patient egd and colon. Dr amezquita is out of the office, and  her next available will be in August at the [place] location, if patient is okay to wait for dr amezquita it will be okay, but if she is okay to have her procedure with any other provider that would be okay

## 2024-06-27 ENCOUNTER — OFFICE VISIT (OUTPATIENT)
Dept: PHYSICAL THERAPY | Facility: CLINIC | Age: 44
End: 2024-06-27
Payer: COMMERCIAL

## 2024-06-27 DIAGNOSIS — R20.2 PARESTHESIAS: Primary | ICD-10-CM

## 2024-06-27 PROCEDURE — 97110 THERAPEUTIC EXERCISES: CPT

## 2024-06-27 PROCEDURE — 97140 MANUAL THERAPY 1/> REGIONS: CPT

## 2024-06-27 NOTE — PROGRESS NOTES
Daily Note     Today's date: 2024  Patient name: Eve Crowley  : 1980  MRN: [id number]  Referring provider: Anna Lynn,*  Dx:   Encounter Diagnosis     ICD-10-CM    1. Paresthesias  R20.2                      Subjective: Pt states that she traveled to a wedding last week and had to return to her symptoms. Indicates that she starting having head tension/pressure and increase tension with the nerve glide. Reports that she is feeling a little better, but still has the symptoms.      Objective: See treatment diary below      Assessment: Tolerated treatment well. Manual techniques were performed to decrease pain and increase cervical and upper thoracic mobility. Pt reported TTP to the L upper trapezius and R scalenes. Pt also reported fatigue to L UE with overhead, postural strengthening activities. Patient demonstrated fatigue post treatment, exhibited good technique with therapeutic exercises, and would benefit from continued PT      Plan: Continue per plan of care.  Progress note during next visit.      Precautions: Tachycardia     Daily Treatment Diary    Date    FOTO             Re-Eval                Manuals    UT, LS, SOR STM  JM JM MSB JM    JM JM JM   Photo biomodulation JM - neck/pinch nerve protocol 13 W (B) 2x JM - neck/pinch nerve protocol 13 W (B) 2x MSB - neck/pinch nerve protocol 13 W (B) 2x JM - neck/pinch nerve protocol 13 W (B) 2x    JM - neck/pinch nerve protocol 12 W (B) 2x JM - neck/pinch nerve protocol 14 W (B) 2x JM - neck/pinch nerve protocol 14 W (B) 2x                             Neuro Re-Ed     Median nerve glide 15x (B) 15x (B) 15x (B) 15x (B)    15x (B) 15x (B) 15x (B)   Median nerve floss - prayer             Ulnar nerve glide             Seated sciatic nerve tensioner - TKE 15x (B) 15x (B) 15x (B) 15x (B)    15x (B) 15x (B) 15x (B)   Supine sciatic nerve glide - anlke pump             Supine cervical retraction with head lift In  "quadruped 3x10   in quadruped 2x10  Supine 3\" hold 15x                      Ther Ex    Seated cervical retraction              Seated cervical retraction with extension         Prone on Tball 2x10  Prone with flexion    Seated scapular retraction with TB             Standing shoulder ext and row with TB         Rows only purple TB 2x15    Standing pec stretch at doorway             Standing wall angels Robbers 3x8 pink TB  Robbers 3x8 pink TB  Robbers 3x8 pink TB      Robbers 3x6 pink TB   Robbers 3x8 pink TB    Standing ER ISO with flexion Pink TB 2x8  Pink TB 2x8      OTB 3x8  Pink TB 2x8   Seated STM stretch              Serratus anterior foam roll ups Supine bench press (+) 3x10 8# DBs  Supine bench press (+) 3x10 8# DBs 3x8 with OTB    Supine bench press (+) 2x10 8# DBs    3x6 with OTB Supine bench press (+) 3x10 8# DBs Supine bench press (+) 3x10 8# DBs   Prone shoulder extension  On Tball (B) 2# 2x10 On Tball (B) 2# 2x10 Black TB 3x10    On Tball (B) 1# 2x8 On Tball (B) 1# 2x10 On Tball (B) 1# 2x10   Prone shoulder abd        On Tball (B) 0# 2x8 On Tball (B) 0# 2x8 On Tball (B) 0# 2x10   Prone shoulder \"W's\" with flexion  3x6 on TBall 3x8 on TBall 3x8 on TBall          Bent over rows 8# 2x15 8# 2x15 8# 2x15 8# 2x10    NV                  Ther Activity                                Gait Training                              Modalities                                       "

## 2024-07-01 ENCOUNTER — TELEPHONE (OUTPATIENT)
Age: 44
End: 2024-07-01

## 2024-07-01 NOTE — TELEPHONE ENCOUNTER
Scheduled date of colonoscopy (as of today):7/17/24  Physician performing colonoscopy:DR.OHM  Location of colonoscopy:[facility]  Bowel prep reviewed with patient:JOSE/KATLIN HORTON  Instructions reviewed with patient by:DILIP  Clearances: NA

## 2024-07-05 ENCOUNTER — TELEPHONE (OUTPATIENT)
Dept: NEUROLOGY | Facility: CLINIC | Age: 44
End: 2024-07-05

## 2024-07-05 NOTE — TELEPHONE ENCOUNTER
I called patient LVM that her F/U appointment has been scheduled with Dr. Mejia in [place] office.

## 2024-07-08 ENCOUNTER — OFFICE VISIT (OUTPATIENT)
Dept: PHYSICAL THERAPY | Facility: CLINIC | Age: 44
End: 2024-07-08
Payer: COMMERCIAL

## 2024-07-08 DIAGNOSIS — R20.2 PARESTHESIAS: Primary | ICD-10-CM

## 2024-07-08 PROCEDURE — 97110 THERAPEUTIC EXERCISES: CPT

## 2024-07-08 PROCEDURE — 97140 MANUAL THERAPY 1/> REGIONS: CPT

## 2024-07-08 NOTE — PROGRESS NOTES
Daily Note + Discharge Note    Today's date: 2024  Patient name: Eve Crowley  : 1980  MRN: [id number]  Referring provider: Anna Lynn,*  Dx:   Encounter Diagnosis     ICD-10-CM    1. Paresthesias  R20.2                      Subjective: Pt states that she is doing pretty good overall. Reports that she was recently traveling this weekend. Today, feels that her whole body is fatigue. Overall, reports that her numbness and tingling occurs minimally and does not really have that much pain either. Despite these improvements, feels that she does not feel quite herself      Objective: See treatment diary below    Pain  Current pain ratin  At best pain ratin  At worst pain ratin  Quality: tight, pulling and dull ache      Objective     Static Posture     Head  Forward.    Shoulders  Elevated and rounded.    Scapulae  Left winging, right winging, left elevated and right elevated.    Thoracic Spine  Flattened thoracic spine.    Palpation   Left   Hypertonic in the cervical paraspinals and levator scapulae.   Tenderness of the cervical paraspinals, latissimus, levator scapulae, middle trapezius, scalenes and upper trapezius.     Right   Hypertonic in the cervical paraspinals, levator scapulae, suboccipitals and upper trapezius.   Tenderness of the cervical paraspinals, latissimus, levator scapulae, middle trapezius, scalenes, suboccipitals and upper trapezius.   Trigger point to suboccipitals.     Neurological Testing     Sensation   Cervical/Thoracic   Left   Intact: light touch    Right   Intact: light touch    Lumbar   Left   Intact: light touch    Right   Intact: light touch    Reflexes   Left   Biceps (C5/C6): normal (2+)  Brachioradialis (C6): normal (2+)  Triceps (C7): trace (2+)  Patellar (L4): brisk (2+)  Achilles (S1): normal (2+)  Wade's reflex: negative  Clonus sign: negative    Right   Biceps (C5/C6): normal (2+)  Brachioradialis (C6): normal (2+)  Triceps (C7): trace  (2+)  Patellar (L4): brisk (2+)  Achilles (S1): normal (2+)  Wade's reflex: negative  Clonus sign: negative    Active Range of Motion   Cervical/Thoracic Spine       Cervical    Left lateral flexion: 25 degrees      Right lateral flexion: 30 degrees     Right rotation:  Restriction level: minimal    Strength/Myotome Testing     Left Shoulder     Planes of Motion   Flexion: 4+   Extension: 4+   Abduction: 4+   External rotation at 0°: 4+     Isolated Muscles   Latissimus: 4   Lower trapezius: 4   Rhomboids: 4-     Right Shoulder     Planes of Motion   Flexion: 4+   Extension: 4+   Abduction: 4+   External rotation at 0°: 4+     Isolated Muscles   Latissimus: 4   Lower trapezius: 4   Rhomboids: 4-     Left Elbow   Flexion: 4+  Extension: 4+    Right Elbow   Flexion: 4+  Extension: 4+    Left Wrist/Hand   Thumb extension: 4+    Right Wrist/Hand   Thumb extension: 4+      Assessment: Tolerated treatment well. Patient demonstrated fatigue post treatment and exhibited good technique with therapeutic exercises. Plan is to transition to HEP vs formal physical therapy at this time. HEP was reviewed and performed. Pt has reported improvement with symptoms, improve deep neck flexor endurance, improvement with AROM, and increase activity tolerance.       Plan:    Advised patient to continue with home exercise program which I reviewed with them today. Advised patient to contact me with any future questions or concerns regarding exercise. Pt is in agreement with discharge plan.       Precautions: Tachycardia     Daily Treatment Diary    Date 6/6 6/12 6/19 6/27 7/8 5/22 5/29 5/31   FOTO             Re-Eval                Manuals    UT, LS, SOR STM  JULI BUSTOS MSB JULI BUSTOS   Photo biomodulation JM - neck/pinch nerve protocol 13 W (B) 2x JM - neck/pinch nerve protocol 13 W (B) 2x MSB - neck/pinch nerve protocol 13 W (B) 2x JM - neck/pinch nerve protocol 13 W (B) 2x JM - neck/pinch nerve protocol 14 W (B) 2x   JM - neck/pinch  "nerve protocol 12 W (B) 2x JM - neck/pinch nerve protocol 14 W (B) 2x JM - neck/pinch nerve protocol 14 W (B) 2x                             Neuro Re-Ed     Median nerve glide 15x (B) 15x (B) 15x (B) 15x (B) 15x (B)   15x (B) 15x (B) 15x (B)   Median nerve floss - prayer             Ulnar nerve glide             Seated sciatic nerve tensioner - TKE 15x (B) 15x (B) 15x (B) 15x (B) 15x (B)   15x (B) 15x (B) 15x (B)   Supine sciatic nerve glide - anlke pump             Supine cervical retraction with head lift In quadruped 3x10   in quadruped 2x10  Supine 3\" hold 15x Supine 3\" hold 2x10                     Ther Ex    Seated cervical retraction              Seated cervical retraction with extension         Prone on Tball 2x10  Prone with flexion    Seated scapular retraction with TB             Standing shoulder ext and row with TB         Rows only purple TB 2x15    Standing pec stretch at doorway             Standing wall angels Robbers 3x8 pink TB  Robbers 3x8 pink TB  Robbers 3x8 pink TB      Robbers 3x6 pink TB   Robbers 3x8 pink TB    Standing ER ISO with flexion Pink TB 2x8  Pink TB 2x8      OTB 3x8  Pink TB 2x8   Seated STM stretch              Serratus anterior foam roll ups Supine bench press (+) 3x10 8# DBs  Supine bench press (+) 3x10 8# DBs 3x8 with OTB    Supine bench press (+) 2x10 8# DBs 3x8 with OTB   3x6 with OTB Supine bench press (+) 3x10 8# DBs Supine bench press (+) 3x10 8# DBs   Prone shoulder extension  On Tball (B) 2# 2x10 On Tball (B) 2# 2x10 Black TB 3x10 2# 2x10 (B)   On Tball (B) 1# 2x8 On Tball (B) 1# 2x10 On Tball (B) 1# 2x10   Prone shoulder abd     2# 2x10 (B)   On Tball (B) 0# 2x8 On Tball (B) 0# 2x8 On Tball (B) 0# 2x10   Prone shoulder \"W's\" with flexion  3x6 on TBall 3x8 on TBall 3x8 on TBall  Standing W's 2x8 red TB        Bent over rows 8# 2x15 8# 2x15 8# 2x15 8# 2x10    NV                  Ther Activity                                Gait Training                            "   Modalities

## 2024-07-10 ENCOUNTER — TELEPHONE (OUTPATIENT)
Dept: GASTROENTEROLOGY | Facility: MEDICAL CENTER | Age: 44
End: 2024-07-10

## 2024-07-10 NOTE — TELEPHONE ENCOUNTER
Confirming Upcoming Procedure: Colonoscopy & EGD on July 17  Physician performing: Dr. Romero  Location of procedure:  [facility]  Prep: Miralax

## 2024-07-17 ENCOUNTER — ANESTHESIA (OUTPATIENT)
Dept: GASTROENTEROLOGY | Facility: MEDICAL CENTER | Age: 44
End: 2024-07-17

## 2024-07-17 ENCOUNTER — HOSPITAL ENCOUNTER (OUTPATIENT)
Dept: GASTROENTEROLOGY | Facility: MEDICAL CENTER | Age: 44
Setting detail: OUTPATIENT SURGERY
Discharge: HOME | End: 2024-07-17
Attending: INTERNAL MEDICINE
Payer: COMMERCIAL

## 2024-07-17 ENCOUNTER — ANESTHESIA EVENT (OUTPATIENT)
Dept: GASTROENTEROLOGY | Facility: MEDICAL CENTER | Age: 44
End: 2024-07-17

## 2024-07-17 VITALS
BODY MASS INDEX: 25.52 KG/M2 | HEIGHT: 63 IN | OXYGEN SATURATION: 100 % | WEIGHT: 144 LBS | HEART RATE: 64 BPM | SYSTOLIC BLOOD PRESSURE: 116 MMHG | RESPIRATION RATE: 16 BRPM | DIASTOLIC BLOOD PRESSURE: 64 MMHG | TEMPERATURE: 97.6 F

## 2024-07-17 DIAGNOSIS — R63.4 UNINTENTIONAL WEIGHT LOSS: ICD-10-CM

## 2024-07-17 PROCEDURE — 88305 TISSUE EXAM BY PATHOLOGIST: CPT | Performed by: PATHOLOGY

## 2024-07-17 PROCEDURE — 43239 EGD BIOPSY SINGLE/MULTIPLE: CPT | Performed by: STUDENT IN AN ORGANIZED HEALTH CARE EDUCATION/TRAINING PROGRAM

## 2024-07-17 PROCEDURE — 45385 COLONOSCOPY W/LESION REMOVAL: CPT | Performed by: STUDENT IN AN ORGANIZED HEALTH CARE EDUCATION/TRAINING PROGRAM

## 2024-07-17 PROCEDURE — 45380 COLONOSCOPY AND BIOPSY: CPT | Performed by: STUDENT IN AN ORGANIZED HEALTH CARE EDUCATION/TRAINING PROGRAM

## 2024-07-17 RX ORDER — PROPOFOL 10 MG/ML
INJECTION, EMULSION INTRAVENOUS AS NEEDED
Status: DISCONTINUED | OUTPATIENT
Start: 2024-07-17 | End: 2024-07-17

## 2024-07-17 RX ORDER — SODIUM CHLORIDE 9 MG/ML
INJECTION, SOLUTION INTRAVENOUS CONTINUOUS PRN
Status: DISCONTINUED | OUTPATIENT
Start: 2024-07-17 | End: 2024-07-17

## 2024-07-17 RX ORDER — LIDOCAINE HYDROCHLORIDE 20 MG/ML
INJECTION, SOLUTION EPIDURAL; INFILTRATION; INTRACAUDAL; PERINEURAL AS NEEDED
Status: DISCONTINUED | OUTPATIENT
Start: 2024-07-17 | End: 2024-07-17

## 2024-07-17 RX ADMIN — PROPOFOL 160 MG: 10 INJECTION, EMULSION INTRAVENOUS at 14:07

## 2024-07-17 RX ADMIN — PROPOFOL 40 MG: 10 INJECTION, EMULSION INTRAVENOUS at 14:21

## 2024-07-17 RX ADMIN — SODIUM CHLORIDE: 0.9 INJECTION, SOLUTION INTRAVENOUS at 14:03

## 2024-07-17 RX ADMIN — PROPOFOL 40 MG: 10 INJECTION, EMULSION INTRAVENOUS at 14:11

## 2024-07-17 RX ADMIN — PROPOFOL 160 MCG/KG/MIN: 10 INJECTION, EMULSION INTRAVENOUS at 14:08

## 2024-07-17 RX ADMIN — LIDOCAINE HYDROCHLORIDE 100 MG: 20 INJECTION, SOLUTION EPIDURAL; INFILTRATION; INTRACAUDAL at 14:07

## 2024-07-17 NOTE — H&P
History and Physical -  [facility]  Eve Crowley 43 y.o. female MRN: [id number]          HPI: Eve Crowley is a 43 y.o. year old female who presents for EGD/colonoscopy to evaluate epigastric pain and unintentional weight loss.      REVIEW OF SYSTEMS: Per the HPI, and otherwise unremarkable.    Historical Information   Past Medical History:   Diagnosis Date    CTS (carpal tunnel syndrome)     Off and on problems     Past Surgical History:   Procedure Laterality Date     SECTION       Social History   Social History     Substance and Sexual Activity   Alcohol Use Not Currently    Alcohol/week: 2.0 - 3.0 standard drinks of alcohol    Types: 2 - 3 Glasses of wine per week    Comment: not in a while     Social History     Substance and Sexual Activity   Drug Use Not Currently    Types: Marijuana    Comment: CBD gummies for anxity but not often     Social History     Tobacco Use   Smoking Status Never   Smokeless Tobacco Never     Family History   Problem Relation Age of Onset    Cancer Mother         possible GI related    Dementia Paternal Grandmother        Meds/Allergies       Current Outpatient Medications:     CARBONYL IRON PO    Cholecalciferol 50 MCG (2000 UT) CAPS    Coenzyme Q10 50 MG CAPS    MAGNESIUM PO    Omega-3 Fatty Acids (Omega-3 Fish Oil) 1000 MG CAPS    ondansetron (Zofran) 4 mg tablet    TiZANidine (ZANAFLEX) 2 MG capsule    No Known Allergies    Objective     There were no vitals taken for this visit.      PHYSICAL EXAM    GEN: NAD  CARDIO: RRR  PULM: CTA bilaterally  ABD: soft, non-tender, non-distended  EXT: no lower extremity edema  NEURO: AAOx3      ASSESSMENT/PLAN:  43 y.o. year old female here for EGD/colonoscopy; she is stable and optimized for her procedure.

## 2024-07-17 NOTE — ANESTHESIA PREPROCEDURE EVALUATION
Procedure:  COLONOSCOPY  EGD    Relevant Problems   NEURO/PSYCH   (+) Paresthesias             Anesthesia Plan  ASA Score- 1     Anesthesia Type- IV sedation with anesthesia with ASA Monitors.         Additional Monitors:     Airway Plan:            Plan Factors-    Chart reviewed.                      Induction- intravenous.    Postoperative Plan-         Informed Consent- Anesthetic plan and risks discussed with patient.  I personally reviewed this patient with the CRNA. Discussed and agreed on the Anesthesia Plan with the CRNA..

## 2024-07-17 NOTE — ANESTHESIA POSTPROCEDURE EVALUATION
Post-Op Assessment Note    CV Status:  Stable    Pain management: adequate       Mental Status:  Alert and awake   Hydration Status:  Euvolemic   PONV Controlled:  Controlled   Airway Patency:  Patent     Post Op Vitals Reviewed: Yes    No anethesia notable event occurred.    Staff: CRNA               BP 91/61 (07/17/24 1437)    Temp      Pulse 82 (07/17/24 1437)   Resp 20 (07/17/24 1437)    SpO2 98 % (07/17/24 1437)

## 2024-07-24 PROCEDURE — 88305 TISSUE EXAM BY PATHOLOGIST: CPT | Performed by: PATHOLOGY

## 2024-08-12 ENCOUNTER — OFFICE VISIT (OUTPATIENT)
Dept: ENDOCRINOLOGY | Facility: CLINIC | Age: 44
End: 2024-08-12
Payer: COMMERCIAL

## 2024-08-12 VITALS
WEIGHT: 146 LBS | DIASTOLIC BLOOD PRESSURE: 76 MMHG | HEIGHT: 63 IN | BODY MASS INDEX: 25.87 KG/M2 | SYSTOLIC BLOOD PRESSURE: 118 MMHG

## 2024-08-12 DIAGNOSIS — E04.1 THYROID NODULE: Primary | ICD-10-CM

## 2024-08-12 PROBLEM — R63.4 WEIGHT LOSS: Status: ACTIVE | Noted: 2024-02-22

## 2024-08-12 PROBLEM — M62.838 MUSCLE SPASMS OF NECK: Status: ACTIVE | Noted: 2024-03-28

## 2024-08-12 PROBLEM — R79.89 ABNORMAL THYROID BLOOD TEST: Status: RESOLVED | Noted: 2024-03-12 | Resolved: 2024-08-12

## 2024-08-12 PROCEDURE — 99214 OFFICE O/P EST MOD 30 MIN: CPT | Performed by: INTERNAL MEDICINE

## 2024-08-12 NOTE — PROGRESS NOTES
"Chief Complaint   Patient presents with   • Thyroid nodule      Referring Provider  No referring provider defined for this encounter.     History of Present Illness:   Eve Crowley is a 43 y.o. female with history of abnormal TSH seen in follow-up. Last in the office with Dr. Cathy Aden in 3/2024.    Not taking any thyroid medications.  She continues to feel unwell. She has had an unintentional weight loss of 40# since 2024.  She is very concerned about her TSH which is in the normal range, but the low end of the normal range. Her insomnia has improved a bit and facial \"tingling\" has improved.  She still has a feeling of swelling in her neck with some voice and neck issues that \"come and go.\" She has seen Rheum for a positive KM and additional testing. She also saw a pulmonologist who suggested anxiety as a cause of her symptoms. She states that anxiety developed after she became unwell. 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     Nodule incidentally noted on MRI and confirmed on u/s in 2024 with us and she had an additional ultrasound in Aug 2024 with [facility]. Both show a right sided, isoechoic nodule ~1.6cm in size. She states [facility] has referred her to an ENT for a biopsy but this office is a distance for her.         Patient Active Problem List   Diagnosis   • Thyroid nodule   • Paresthesias   • Neck discomfort   • Tachycardia   • Weight loss   • Muscle spasms of neck      Past Medical History:   Diagnosis Date   • CTS (carpal tunnel syndrome)     Off and on problems      Past Surgical History:   Procedure Laterality Date   •  SECTION        Family History   Problem Relation Age of Onset   • Cancer Mother         possible GI related   • Dementia Paternal Grandmother      Social History     Tobacco Use   • Smoking status: Never   • Smokeless tobacco: Never   Substance Use Topics   • Alcohol use: Not Currently     Alcohol/week: 2.0 - " "3.0 standard drinks of alcohol     Types: 2 - 3 Glasses of wine per week     Comment: not in a while     No Known Allergies      Current Outpatient Medications:   •  CARBONYL IRON PO, Take by mouth, Disp: , Rfl:   •  Cholecalciferol 50 MCG (2000 UT) CAPS, Take 1 capsule by mouth (Patient not taking: Reported on 8/12/2024), Disp: , Rfl:   •  ondansetron (Zofran) 4 mg tablet, Take 4 mg by mouth every 8 (eight) hours as needed for nausea or vomiting (Patient not taking: Reported on 8/12/2024), Disp: , Rfl:   •  TiZANidine (ZANAFLEX) 2 MG capsule, Take 1 capsule (2 mg total) by mouth daily at bedtime as needed for muscle spasms (Patient not taking: Reported on 8/12/2024), Disp: 30 capsule, Rfl: 1  Review of Systems    Physical Exam:  Body mass index is 25.86 kg/m².  /76 (BP Location: Left arm, Patient Position: Sitting, Cuff Size: Standard)   Ht 5' 3\" (1.6 m)   Wt 66.2 kg (146 lb)   BMI 25.86 kg/m²    Wt Readings from Last 3 Encounters:   08/12/24 66.2 kg (146 lb)   07/17/24 65.3 kg (144 lb)   05/07/24 73.7 kg (162 lb 6.4 oz)       GEN: NAD  E/n/m nl facies, hearing intact bilat, tongue midline, lips nl  Eyes: no stare or proptosis, EOMI  Neck: trachea midline, thyroid NT to palpation, nl in size, no nodules or neck masses noted, no cervical LAD  Neuro: no tremor, 2+ DTRs in BUE  MS: no c/c in digits, moves all 4 ext, nl muscle bulk, gait nl  Skin: warm and dry, no palmar erythema  Ext: no c/c in digits, no edema bilaterally, 2+ DP and PT pulses bilat, no breaks in skin/ulcers on feet, sensation intact to monofilament testing on plantar surfaces bilat  Psych: nl mood and affect, no gross lapses in memory    DATA:  Labs:       Lab Results   Component Value Date    TSH 0.63 07/23/2024    FREET4 0.82 04/09/2024         Radiology  4/2024     FINDINGS:  Thyroid texture: Normal homogeneous smooth echotexture.     Right lobe: 5.5 x 1.5 x 1.7 cm. Volume 6.9 mL  Left lobe: 4.8 x 1.2 x 1.6 cm. Volume 4.5 mL  Isthmus: 0.3 " cm.     Nodule #1. Image 7.  RIGHT midgland nodule measuring 1.7 x 0.8 x 0.9 cm.  COMPOSITION: 2 points, solid or almost completely solid.  ECHOGENICITY: 1 point, hyperechoic or isoechoic.  SHAPE: 0 points, wider-than-tall.  MARGIN: 0 points, smooth.  ECHOGENIC FOCI: 0 points, none or large comet-tail artifacts.  TI-RADS Classification: TR 3 (3 points), FNA if > 2.5 cm. Follow if > 1.5 cm..           IMPRESSION:     No nodule meets current ACR criteria for requiring biopsy but follow-up ultrasound is recommended in 1 year.    Impression:  1. Thyroid nodule           Plan:    Eve was seen today for thyroid nodule.    Diagnoses and all orders for this visit:    Thyroid nodule  -     US guided thyroid biopsy with molecular testing; Future  -     TSH, 3rd generation; Future  -     T4, free; Future          Thyroid nodule: I discussed the incidence and prevalence of thyroid nodules in general, and risk of malignancy. Her nodule could be biopsied but could also be monitoring. She is inclined for biopsy as she would like to know what is happening. FNA of the Cytology results include benign, malignant, indeterminate, and non-diagnostic, with additional molecular evaluation based on these. Order is provided     Weight loss: She is concerned that the TSH/thyroid is the cause of her symptoms. Her Last two TSH values are normal. Will check a TSH and Free T4 in October      Discussed with the patient and all questioned fully answered. She will call me if any problems arise.        Cathy Beaulieu MD

## 2024-09-11 ENCOUNTER — TELEPHONE (OUTPATIENT)
Dept: RADIOLOGY | Facility: HOSPITAL | Age: 44
End: 2024-09-11

## 2024-09-11 NOTE — NURSING NOTE
Call placed to pt to discuss upcoming appointment at [facility] Radiology Department and consultation completed.  Pt is having a Thyroid Biopsy with Molecular Testing completed on 9/13/2024.  Allergies reviewed and verified pt does not currently take any anticoagulant medications.  Pre procedure instructions including diet and taking own medications discussed with pt.  Pt instructed that she may eat normally and take medications as usual before the procedure.  Pt verbalized understanding of instructions given.  Reminded pt of the location, date and time of procedure.  My number was given to call if any questions or concerns arise pre or post procedure.

## 2024-09-13 ENCOUNTER — HOSPITAL ENCOUNTER (OUTPATIENT)
Dept: ULTRASOUND IMAGING | Facility: HOSPITAL | Age: 44
Discharge: HOME | End: 2024-09-13
Attending: INTERNAL MEDICINE
Payer: COMMERCIAL

## 2024-09-13 DIAGNOSIS — E04.1 THYROID NODULE: ICD-10-CM

## 2024-09-13 PROCEDURE — 88173 CYTOPATH EVAL FNA REPORT: CPT | Performed by: PATHOLOGY

## 2024-09-13 PROCEDURE — 10005 FNA BX W/US GDN 1ST LES: CPT

## 2024-09-13 RX ORDER — LIDOCAINE HYDROCHLORIDE 10 MG/ML
5 INJECTION, SOLUTION EPIDURAL; INFILTRATION; INTRACAUDAL; PERINEURAL ONCE
Status: COMPLETED | OUTPATIENT
Start: 2024-09-13 | End: 2024-09-13

## 2024-09-13 RX ADMIN — LIDOCAINE HYDROCHLORIDE 5 ML: 10 INJECTION, SOLUTION EPIDURAL; INFILTRATION; INTRACAUDAL; PERINEURAL at 14:30

## 2024-09-17 PROCEDURE — 88173 CYTOPATH EVAL FNA REPORT: CPT | Performed by: PATHOLOGY

## 2024-09-24 ENCOUNTER — OFFICE VISIT (OUTPATIENT)
Dept: GASTROENTEROLOGY | Facility: MEDICAL CENTER | Age: 44
End: 2024-09-24
Payer: COMMERCIAL

## 2024-09-24 VITALS
HEIGHT: 63 IN | SYSTOLIC BLOOD PRESSURE: 115 MMHG | BODY MASS INDEX: 25.73 KG/M2 | TEMPERATURE: 98.1 F | WEIGHT: 145.2 LBS | DIASTOLIC BLOOD PRESSURE: 75 MMHG | HEART RATE: 84 BPM

## 2024-09-24 DIAGNOSIS — R63.4 UNINTENTIONAL WEIGHT LOSS: Primary | ICD-10-CM

## 2024-09-24 PROCEDURE — 99211 OFF/OP EST MAY X REQ PHY/QHP: CPT | Performed by: INTERNAL MEDICINE

## 2024-09-24 NOTE — PROGRESS NOTES
[facility] - Outpatient Follow-up Note  Eve Crowley 44 y.o. female MRN: [id number]  Encounter: [id number]          ASSESSMENT AND PLAN:      1. Unintentional weight loss  Patient reported her weight loss has been stabilized.  She is stabilized at around 145 pounds.  GI workups has been negative.  She was also evaluated by rheumatology for elevated KM.  She was told she does not have any autoimmune disease.  Patient started following up with functional medicine.    Patient can follow-up with our service on as-needed basis.    ______________________________________________________________________    SUBJECTIVE: 44-year-old female presented for an intentional weight loss.  She had epigastric pain and nausea.  She also had tingling of fingers.  Patient underwent a CAT scan with IV contrast with normal findings.  She also underwent EGD and colonoscopy was both normal findings.  Biopsy was negative for celiac disease or H. pylori infection.  Colon biopsy was negative for microscopic colitis.  It was recommended for her to repeat colonoscopy in 10 years.      REVIEW OF SYSTEMS IS OTHERWISE NEGATIVE.      Historical Information   Past Medical History:   Diagnosis Date    CTS (carpal tunnel syndrome)     Off and on problems     Past Surgical History:   Procedure Laterality Date     SECTION      US GUIDED THYROID BIOPSY  2024     Social History   Social History     Substance and Sexual Activity   Alcohol Use Not Currently    Alcohol/week: 2.0 - 3.0 standard drinks of alcohol    Types: 2 - 3 Glasses of wine per week    Comment: not in a while     Social History     Substance and Sexual Activity   Drug Use Not Currently    Types: Marijuana    Comment: CBD gummies for anxity but not often     Social History     Tobacco Use   Smoking Status Never   Smokeless Tobacco Never     Family History   Problem Relation Age of Onset    Cancer Mother         possible GI related    Dementia  "Paternal Grandmother        Meds/Allergies       Current Outpatient Medications:     CARBONYL IRON PO    Cholecalciferol 50 MCG (2000 UT) CAPS    ondansetron (Zofran) 4 mg tablet    TiZANidine (ZANAFLEX) 2 MG capsule    No Known Allergies        Objective     Blood pressure 115/75, pulse 84, temperature 98.1 °F (36.7 °C), temperature source Tympanic, height 5' 3\" (1.6 m), weight 65.9 kg (145 lb 3.2 oz). Body mass index is 25.72 kg/m².      PHYSICAL EXAM:      General Appearance:   Alert, cooperative, no distress   HEENT:   Normocephalic, atraumatic, anicteric.     Neck:  Supple, symmetrical, trachea midline   Lungs:   Clear to auscultation bilaterally; no rales, rhonchi or wheezing; respirations unlabored    Heart::   Regular rate and rhythm; no murmur, rub, or gallop.   Abdomen:   Soft, non-tender, non-distended; normal bowel sounds; no masses, no organomegaly    Genitalia:   Deferred    Rectal:   Deferred    Extremities:  No cyanosis, clubbing or edema    Pulses:  2+ and symmetric    Skin:  No jaundice, rashes, or lesions    Lymph nodes:  No palpable cervical lymphadenopathy        Lab Results:   No visits with results within 1 Day(s) from this visit.   Latest known visit with results is:   Hospital Outpatient Visit on 09/13/2024   Component Date Value    Case Report 09/13/2024                      Value:Non-gynecologic Cytology                          Case: [id number]                                  Authorizing Provider:  Cathy Beaulieu MD          Collected:           09/13/2024 1612              Ordering Location:     [facility]        Received:            09/13/2024 [address] [place] [address] Ultrasound                                                         Pathologist:           Carmen Sales MD                                                                    Specimens:   A) - Thyroid, Right, mid                                                                            B) - " "Thyroid, Right, mid                                                                   Final Diagnosis 09/13/2024                      Value:A-B. Thyroid, Right, mid (ThinPrep and smear preparations):  Benign ([place] Category II) - See note.  Benign follicular cells with colloid.    Satisfactory for evaluation.     Note: As reported in the [place] System for Reporting Thyroid Cytopathology*, the diagnostic category of \"benign/negative for malignancy\" carries a 0-3% risk of malignancy being found in subsequent resections (in the few studies of patients with benign FNA results that were followed long-term, a false negative rate of 0-3% was reported), with the usual management being clinical follow-up supplemented by ultrasonography (US) as indicated. Repeat FNA may be indicated for nodules showing significant growth or developing US abnormalities. Ultimately, clinical/imaging correlation for this patient is needed in arriving at the actual management plan.    *The [place] System for Reporting Thyroid Cytopathology, Philip Felipe., Arnel Walker (Eds.), 2018 (2nd ed.)    Interpretation performed at [facility], [facility] [place] [address]        Note 09/13/2024                      Value:The treating physician has requested a sample(s) from the above thyroid nodule(s) be sent for analysis by  [facility] Genomic Sequencing  (Afirma GSC), performed by [employer] ([place], [place]).  [facility] only performs this test on specimen(s) receiving a cytologic diagnosis of [place] Category III or  IV. If such a diagnosis is rendered (above) specimen will be sent to [facility], and a separate report with  results of [employer] GSC will follow directly from [facility] (typically taking 14 days). If a cytologic  interpretation other than Austin category III or IV is rendered, specimen will not be sent for Afirma GSC.        Intraoperative Consultat* 09/13/2024                      " Value:B. Adequate. 3 passes reviewed. Marifer Mckeon, Interpretation performed at [facility], [address] [facility] [address]        Gross Description 09/13/2024                      Value:A. 20mL, pink, slightly hazy, received in [employer]to[employer]yt     B. 6 slides received ( 3 alcohol, 3 Diff Quik)         Additional Information 09/13/2024                      Value:[employer]'s FDA approved ,  and ThinPrep Imaging Duo System are utilized with strict adherence to the 's instruction manual to prepare gynecologic and non-gynecologic cytology specimens for the production of ThinPrep slides as well as for gynecologic ThinPrep imaging. These processes have been validated by our laboratory and/or by the .    These tests were developed and their performance characteristics determined by [facility] or [facility]. They may not be cleared or approved by the U.S. Food and Drug Administration. The FDA has determined that such clearance or approval is not necessary. These tests are used for clinical purposes. They should not be regarded as investigational or for research. This laboratory has been approved by CLIA 88, designated as a high-complexity laboratory and is qualified to perform these tests.             Radiology Results:   US guided thyroid biopsy with molecular testing    Result Date: 9/16/2024  Narrative: ULTRASOUND-GUIDED THYROID BIOPSY HISTORY: 43-year-old female with history of right mid gland thyroid nodule. COMPARISON: Ultrasound of thyroid dated 4/9/2024, ultrasound of thyroid from outside facility dated 8/6/2024 FINDINGS: Prior ultrasound images were reviewed. The right mid gland thyroid nodule which measures 1.6 cm in its largest dimension on most recent ultrasound was targeted for biopsy The procedure was explained to the patient, including risks of hemorrhage, infection and local injury. Informed consent was freely obtained. The patient  "verbalized understanding of the above risks and wished to proceed with the procedure. Final standard \"time-out\" procedure performed. PROCEDURE: The neck was prepped and draped in normal sterile fashion. Under real-time ultrasound guidance and local anesthesia, 5 passes with a 25 gauge needle were made through the right mid gland thyroid nodule. Cytopathology was present and deemed the specimens adequate for evaluation. Two of the samples obtained were reserved for potential molecular testing. The patient tolerated the procedure well. Post-procedure instructions were provided for the patient. I asked the patient to call us with any questions, concerns, or acute problems. The patient expressed understanding of the above.     Impression: Status post successful ultrasound-guided thyroid biopsy. Procedure was performed by Tea CLINE under the direct supervision of Dr. Matt Cordoba. Workstation performed: [id number]    "

## 2024-12-10 ENCOUNTER — OFFICE VISIT (OUTPATIENT)
Dept: NEUROLOGY | Facility: CLINIC | Age: 44
End: 2024-12-10
Payer: COMMERCIAL

## 2024-12-10 VITALS
DIASTOLIC BLOOD PRESSURE: 84 MMHG | HEIGHT: 63 IN | WEIGHT: 148 LBS | OXYGEN SATURATION: 99 % | SYSTOLIC BLOOD PRESSURE: 132 MMHG | HEART RATE: 92 BPM | BODY MASS INDEX: 26.22 KG/M2

## 2024-12-10 DIAGNOSIS — R20.2 PARESTHESIAS: Primary | ICD-10-CM

## 2024-12-10 PROCEDURE — 99214 OFFICE O/P EST MOD 30 MIN: CPT | Performed by: PSYCHIATRY & NEUROLOGY

## 2024-12-10 RX ORDER — MULTIVIT WITH MINERALS/LUTEIN
500 TABLET ORAL DAILY
COMMUNITY

## 2024-12-10 RX ORDER — DULOXETIN HYDROCHLORIDE 30 MG/1
CAPSULE, DELAYED RELEASE ORAL
Qty: 187 CAPSULE | Refills: 0 | Status: SHIPPED | OUTPATIENT
Start: 2024-12-10 | End: 2025-03-17

## 2024-12-10 NOTE — ASSESSMENT & PLAN NOTE
Patient returns for neuro follow-up 9 months since last visit with some changes to symptoms but overall no improvement or worsening.  Main changes include no further symptoms to face but more frequent episodes to bilateral but right greater than left hands of tingling/vibration sensation.  Medication trials in the past for possibility of MPS have yielded mixed results, tizanidine may have done some good temporarily but does not seem to do anything long-term for patient, thus self discontinued, and PT may have provided benefit initially but benefit tapered out.  Patient is already followed with endocrine and rheumatology with mixed results but none specifically or fully explaining various symptoms patient expresses today.  After thorough review of all various lab results and imaging over the past several months, etiology remains uncertain however possibility of fibromyalgia exists in addition to the MPS described in prior neurology notes, as well as possibility of carpal tunnel syndrome right greater than left.  Patient agreeable to a trial of wrist brace to see if any symptoms improved particularly when worn overnight.  Patient has been ordered an EMG for the right upper extremities to evaluate for the possibility of CTS.  Finally, duloxetine trial for remaining suspicion of fibromyalgia has also been ordered as one of the few medications approved for the treatment of fibromyalgia in addition to other such as Pregabalin which remains as backup option in the future.  Patient is aware that she may reach out to neuro clinic in the interim between appointments, expected follow-up appointment in 4-5 months.  Orders:    EMG 2 Limb Upper Extremity; Future    DULoxetine (Cymbalta) 30 mg delayed release capsule; Take 1 capsule (30 mg total) by mouth daily for 7 days, THEN 2 capsules (60 mg total) daily.

## 2024-12-10 NOTE — PATIENT INSTRUCTIONS
Obtain EMG in next few months  Trial duloxetine prescription as prescribed  Return in 4 months for neuro follow up

## 2024-12-10 NOTE — PROGRESS NOTES
Name: Eve Crowley      : 1980      MRN: [id number]  Encounter Provider: Michael Mejia DO  Encounter Date: 12/10/2024   Encounter department: [facility] [place]    Assessment & Plan  Paresthesias  Patient returns for neuro follow-up 9 months since last visit with some changes to symptoms but overall no improvement or worsening.  Main changes include no further symptoms to face but more frequent episodes to bilateral but right greater than left hands of tingling/vibration sensation.  Medication trials in the past for possibility of MPS have yielded mixed results, tizanidine may have done some good temporarily but does not seem to do anything long-term for patient, thus self discontinued, and PT may have provided benefit initially but benefit tapered out.  Patient is already followed with endocrine and rheumatology with mixed results but none specifically or fully explaining various symptoms patient expresses today.  After thorough review of all various lab results and imaging over the past several months, etiology remains uncertain however possibility of fibromyalgia exists in addition to the MPS described in prior neurology notes, as well as possibility of carpal tunnel syndrome right greater than left.  Patient agreeable to a trial of wrist brace to see if any symptoms improved particularly when worn overnight.  Patient has been ordered an EMG for the right upper extremities to evaluate for the possibility of CTS.  Finally, duloxetine trial for remaining suspicion of fibromyalgia has also been ordered as one of the few medications approved for the treatment of fibromyalgia in addition to other such as Pregabalin which remains as backup option in the future.  Patient is aware that she may reach out to neuro clinic in the interim between appointments, expected follow-up appointment in 4-5 months.  Orders:    EMG 2 Limb Upper Extremity; Future    DULoxetine (Cymbalta) 30 mg delayed  "release capsule; Take 1 capsule (30 mg total) by mouth daily for 7 days, THEN 2 capsules (60 mg total) daily.      Patient Instructions   Obtain EMG in next few months  Trial duloxetine prescription as prescribed  Return in 4 months for neuro follow up     History of Present Illness   Patient presents for neurology follow-up of various complaints predominantly related to paresthesias.  Per prior neuro note by Dr. Wolfe, \"Patient continues to have bilateral facial tingling in the V2 and V3 distribution, sparing the V1 areas. Previously also had symptoms in right index finger which have since resolved. Notes new tingling in upper and middle back. At last visit we ordered an MRI of the cervical spine which was relatively normal. There was some straightening of the cervical spine but otherwise the cord and nerve roots were all normal. Interestingly, at the same time her paresthesias started, she also was diagnosed with subacute thyroiditis which was suspected to be due to viral illness and may have also had a mild viral meningitis at the same time though LP was never performed. Overall she has improved from the endocrine standpoint. Still having intermittent fast palpitations and have asked for her to follow up with her cardiologist. She had a Holter monitor which was normal, Zio patch was denied. Today, on her exam there is marked muscle spasm in the bilateral cervical paraspinal muscles, trapezius muscles, scalenes, rhomboids, and latissimus dorsi. Palpation of the paraspinal muscles reproduces some of her paresthesias. With normal MRI and the exam findings noted, have strong suspicion that her symptoms are due to myofascial pain syndrome, and as such, will refer pt to PT. Have also encouraged her to use heat, stretching at home, and use Tizanidine as needed. However, with her ongoing symptoms, will also add on additional testing for a few other autoimmune conditions including KM, RF, and anti-scl antibodies. We " "will plan on following up with her in approximately 3-4 months or sooner if needed. Advised to call with questions or concerns.\"  Today, patient reports complete resolution of all abnormal sensations to face, however continues to report odd tingling to right greater than left hands predominantly to the fingertips and occasionally to various other locations such as to back of head and to labia.  These episodes most frequently occur in the right hand and last anywhere from minutes to hours but generally not for the full day.  No particular known provoking factor or triggering event, appear to happen at random.  Significant workup already performed prior to this neuro follow-up visit including visits with rheumatology and endocrinology and substantial blood work from both specialist have revealed various somewhat abnormal findings but ultimately no specific reasoning for patient's symptoms.  Possibility exists of fibromyalgia, which is more a diagnosis of exclusion.  It is possible that inflammation general throughout the body can perhaps precipitate nerve compression/impingement which could lead to some of the symptoms such as the face into the hands, the latter in the setting of carpal tunnel syndrome.  Patient has taken several different herbal supplementations of uncertain effectiveness in general populations, neurology or discussion in selecting these medications as she has been doing in the past as safety has not been surely documented yet although anecdotal evidence has shown the possibility of some improvement on these supplements, some of which have been from seeing functional medicine.      Review of Systems   Constitutional:  Negative for appetite change, fatigue and fever.   HENT: Negative.  Negative for hearing loss, tinnitus, trouble swallowing and voice change.    Eyes: Negative.  Negative for photophobia, pain and visual disturbance.   Respiratory:  Positive for cough. Negative for shortness of breath.  "   Cardiovascular: Negative.  Negative for chest pain, palpitations and leg swelling.   Gastrointestinal: Negative.  Negative for nausea and vomiting.   Endocrine: Negative.  Negative for cold intolerance and heat intolerance.   Genitourinary:  Positive for vaginal pain. Negative for dysuria, frequency and urgency.   Musculoskeletal:  Negative for back pain, gait problem, myalgias, neck pain and neck stiffness.   Skin: Negative.  Negative for color change and rash.   Allergic/Immunologic: Negative.  Negative for environmental allergies, food allergies and immunocompromised state.   Neurological:  Positive for numbness. Negative for dizziness, tremors, seizures, syncope, facial asymmetry, speech difficulty, weakness, light-headedness and headaches.   Hematological: Negative.  Negative for adenopathy. Does not bruise/bleed easily.   Psychiatric/Behavioral: Negative.  Negative for confusion, hallucinations and sleep disturbance.     I have personally reviewed the MA's review of systems and made changes as necessary.    Current Outpatient Medications on File Prior to Visit   Medication Sig Dispense Refill    ascorbic acid (VITAMIN C) 250 mg tablet Take 500 mg by mouth daily      CARBONYL IRON PO Take by mouth      Cholecalciferol 50 MCG (2000 UT) CAPS Take 1 capsule by mouth      MAGNESIUM PO Take by mouth (Patient not taking: Reported on 12/10/2024)      ondansetron (Zofran) 4 mg tablet Take 4 mg by mouth every 8 (eight) hours as needed for nausea or vomiting (Patient not taking: Reported on 8/12/2024)      TiZANidine (ZANAFLEX) 2 MG capsule Take 1 capsule (2 mg total) by mouth daily at bedtime as needed for muscle spasms (Patient not taking: Reported on 8/12/2024) 30 capsule 1     No current facility-administered medications on file prior to visit.      Social History     Tobacco Use    Smoking status: Never    Smokeless tobacco: Never   Vaping Use    Vaping status: Never Used   Substance and Sexual Activity    Alcohol  "use: Not Currently     Alcohol/week: 2.0 - 3.0 standard drinks of alcohol     Types: 2 - 3 Glasses of wine per week     Comment: not in a while    Drug use: Not Currently     Types: Marijuana     Comment: CBD gummies for anxity but not often    Sexual activity: Yes     Partners: Male     Birth control/protection: Surgical        Objective   /84 (BP Location: Right arm, Patient Position: Sitting, Cuff Size: Standard)   Pulse 92   Ht 5' 3\" (1.6 m)   Wt 67.1 kg (148 lb)   SpO2 99%   BMI 26.22 kg/m²     Physical Exam  Vitals and nursing note reviewed.   Constitutional:       General: She is not in acute distress.     Appearance: She is not ill-appearing, toxic-appearing or diaphoretic.   HENT:      Head: Normocephalic and atraumatic.      Right Ear: External ear normal.      Left Ear: External ear normal.      Nose: Nose normal.      Mouth/Throat:      Pharynx: Oropharynx is clear.   Eyes:      General: Lids are normal. No scleral icterus.        Right eye: No discharge.         Left eye: No discharge.      Extraocular Movements: Extraocular movements intact.      Conjunctiva/sclera: Conjunctivae normal.      Pupils: Pupils are equal, round, and reactive to light.   Neurological:      General: No focal deficit present.      Mental Status: She is alert and oriented to person, place, and time. Mental status is at baseline.      Cranial Nerves: No cranial nerve deficit.      Sensory: No sensory deficit.      Motor: No weakness.      Coordination: Coordination is intact. Coordination normal.      Gait: Gait normal.      Deep Tendon Reflexes: Reflexes normal.   Psychiatric:         Speech: Speech normal.       Neurological Exam  Mental Status  Alert. Oriented to person, place, time and situation. Oriented to person, place, and time. Speech is normal. Language is fluent with no aphasia.    Cranial Nerves  CN I: Sense of smell is normal.  CN II: Visual acuity is normal. Visual fields full to confrontation.  CN III, " IV, VI: Extraocular movements intact bilaterally. Normal lids and orbits bilaterally. Pupils equal round and reactive to light bilaterally.  CN V: Facial sensation is normal.  CN VII: Full and symmetric facial movement.  CN VIII: Hearing is normal.  CN IX, X: Palate elevates symmetrically. Normal gag reflex.  CN XI: Shoulder shrug strength is normal.  CN XII: Tongue midline without atrophy or fasciculations.    Motor  Normal muscle bulk throughout. No fasciculations present. Normal muscle tone. No abnormal involuntary movements. Strength is 5/5 in all four extremities except as noted.    Sensory  Sensation is intact to light touch, pinprick, vibration and proprioception in all four extremities.    Reflexes  Deep tendon reflexes are 2+ and symmetric except as noted.    Coordination    Finger-to-nose, rapid alternating movements and heel-to-shin normal bilaterally without dysmetria.    Gait  Normal casual, toe, heel and tandem gait. Normal gait.      Radiology Results Review: I personally reviewed the following image studies in PACS and associated radiology reports: MRI brain and MRI spine. My interpretation of the radiology images/reports is: as noted in reports.    Administrative Statements   I have spent a total time of 60 minutes in caring for this patient on the day of the visit/encounter including Diagnostic results, Risks and benefits of tx options, Patient and family education, Impressions, Counseling / Coordination of care, Documenting in the medical record, Reviewing / ordering tests, medicine, procedures  , Obtaining or reviewing history  , and Communicating with other healthcare professionals .

## 2025-01-03 NOTE — TELEPHONE ENCOUNTER
LVM to schedule colon egd, Dr amezquita is out of the office her next available will be in august, if patient decided  she wants another provider that's is okay    See message

## 2025-05-07 ENCOUNTER — TELEPHONE (OUTPATIENT)
Age: 45
End: 2025-05-07

## 2025-05-07 NOTE — TELEPHONE ENCOUNTER
Called patient in attempt to reschedule the canceled appt. No answer. LMOM and call back phone number .    Thank you          Appointment canceled for Eve Crowley ([id number])   Visit type: OFFICE VISIT SHORT [facility]   5/13/2025 2:30 PM (30 minutes) with Michael Mejia DO in [facility]      Reason for cancellation: Patient

## 2025-07-03 LAB
T4 FREE SERPL-MCNC: 1.06 NG/DL (ref 0.61–1.12)
TSH SERPL-ACNC: 0.82 UIU/ML (ref 0.45–5.33)

## 2025-07-23 ENCOUNTER — TELEPHONE (OUTPATIENT)
Dept: ENDOCRINOLOGY | Facility: CLINIC | Age: 45
End: 2025-07-23